# Patient Record
Sex: FEMALE
[De-identification: names, ages, dates, MRNs, and addresses within clinical notes are randomized per-mention and may not be internally consistent; named-entity substitution may affect disease eponyms.]

---

## 2017-11-06 ENCOUNTER — APPOINTMENT (OUTPATIENT)
Dept: OBGYN | Facility: CLINIC | Age: 32
End: 2017-11-06
Payer: COMMERCIAL

## 2017-11-06 ENCOUNTER — LABORATORY RESULT (OUTPATIENT)
Age: 32
End: 2017-11-06

## 2017-11-06 VITALS
DIASTOLIC BLOOD PRESSURE: 78 MMHG | HEIGHT: 62 IN | WEIGHT: 152 LBS | SYSTOLIC BLOOD PRESSURE: 110 MMHG | BODY MASS INDEX: 27.97 KG/M2

## 2017-11-06 DIAGNOSIS — Z78.9 OTHER SPECIFIED HEALTH STATUS: ICD-10-CM

## 2017-11-06 LAB
BASOPHILS # BLD AUTO: 0.01 K/UL
BASOPHILS NFR BLD AUTO: 0.1 %
EOSINOPHIL # BLD AUTO: 0.2 K/UL
EOSINOPHIL NFR BLD AUTO: 2 %
HCT VFR BLD CALC: 35.3 %
HGB BLD-MCNC: 11.9 G/DL
HIV1+2 AB SPEC QL IA.RAPID: NONREACTIVE
IMM GRANULOCYTES NFR BLD AUTO: 0.4 %
LYMPHOCYTES # BLD AUTO: 1.86 K/UL
LYMPHOCYTES NFR BLD AUTO: 19 %
MAN DIFF?: NORMAL
MCHC RBC-ENTMCNC: 29.8 PG
MCHC RBC-ENTMCNC: 33.7 GM/DL
MCV RBC AUTO: 88.5 FL
MONOCYTES # BLD AUTO: 0.43 K/UL
MONOCYTES NFR BLD AUTO: 4.4 %
NEUTROPHILS # BLD AUTO: 7.24 K/UL
NEUTROPHILS NFR BLD AUTO: 74.1 %
PLATELET # BLD AUTO: 271 K/UL
RBC # BLD: 3.99 M/UL
RBC # FLD: 13.6 %
WBC # FLD AUTO: 9.78 K/UL

## 2017-11-06 PROCEDURE — 99213 OFFICE O/P EST LOW 20 MIN: CPT | Mod: 25

## 2017-11-06 PROCEDURE — 99395 PREV VISIT EST AGE 18-39: CPT

## 2017-11-06 PROCEDURE — 99214 OFFICE O/P EST MOD 30 MIN: CPT | Mod: 25

## 2017-11-07 LAB
ABO + RH PNL BLD: NORMAL
BLD GP AB SCN SERPL QL: NORMAL
HBV SURFACE AG SER QL: NONREACTIVE
RUBV IGG FLD-ACNC: 1.5 INDEX
RUBV IGG SER-IMP: POSITIVE
T PALLIDUM AB SER QL IA: NEGATIVE
TSH SERPL-ACNC: 2.03 UIU/ML

## 2017-11-09 LAB
C TRACH RRNA SPEC QL NAA+PROBE: NOT DETECTED
CYTOLOGY CVX/VAG DOC THIN PREP: NORMAL
N GONORRHOEA RRNA SPEC QL NAA+PROBE: NOT DETECTED
SOURCE TP AMPLIFICATION: NORMAL

## 2017-12-05 ENCOUNTER — ASOB RESULT (OUTPATIENT)
Age: 32
End: 2017-12-05

## 2017-12-05 ENCOUNTER — APPOINTMENT (OUTPATIENT)
Dept: ANTEPARTUM | Facility: CLINIC | Age: 32
End: 2017-12-05
Payer: COMMERCIAL

## 2017-12-05 ENCOUNTER — APPOINTMENT (OUTPATIENT)
Dept: OBGYN | Facility: CLINIC | Age: 32
End: 2017-12-05
Payer: COMMERCIAL

## 2017-12-05 VITALS
HEIGHT: 62 IN | SYSTOLIC BLOOD PRESSURE: 110 MMHG | WEIGHT: 185 LBS | BODY MASS INDEX: 34.04 KG/M2 | DIASTOLIC BLOOD PRESSURE: 70 MMHG

## 2017-12-05 LAB
BILIRUB UR QL STRIP: NORMAL
GLUCOSE UR-MCNC: NORMAL
HCG UR QL: 0.2 EU/DL
HGB UR QL STRIP.AUTO: NORMAL
KETONES UR-MCNC: NORMAL
LEUKOCYTE ESTERASE UR QL STRIP: NORMAL
NITRITE UR QL STRIP: NORMAL
PH UR STRIP: 7.5
PROT UR STRIP-MCNC: NORMAL
SP GR UR STRIP: 1.02

## 2017-12-05 PROCEDURE — 76811 OB US DETAILED SNGL FETUS: CPT

## 2017-12-05 PROCEDURE — 0502F SUBSEQUENT PRENATAL CARE: CPT

## 2017-12-05 PROCEDURE — 59426 ANTEPARTUM CARE ONLY: CPT

## 2018-01-02 ENCOUNTER — APPOINTMENT (OUTPATIENT)
Dept: OBGYN | Facility: CLINIC | Age: 33
End: 2018-01-02
Payer: COMMERCIAL

## 2018-01-02 VITALS
HEIGHT: 62 IN | BODY MASS INDEX: 34.78 KG/M2 | SYSTOLIC BLOOD PRESSURE: 108 MMHG | WEIGHT: 189 LBS | DIASTOLIC BLOOD PRESSURE: 60 MMHG

## 2018-01-02 PROCEDURE — 0502F SUBSEQUENT PRENATAL CARE: CPT

## 2018-01-16 ENCOUNTER — LABORATORY RESULT (OUTPATIENT)
Age: 33
End: 2018-01-16

## 2018-01-17 ENCOUNTER — OTHER (OUTPATIENT)
Age: 33
End: 2018-01-17

## 2018-01-30 ENCOUNTER — APPOINTMENT (OUTPATIENT)
Dept: OBGYN | Facility: CLINIC | Age: 33
End: 2018-01-30
Payer: COMMERCIAL

## 2018-01-30 VITALS
BODY MASS INDEX: 35.7 KG/M2 | WEIGHT: 194 LBS | SYSTOLIC BLOOD PRESSURE: 110 MMHG | DIASTOLIC BLOOD PRESSURE: 68 MMHG | HEIGHT: 62 IN

## 2018-01-30 LAB
BILIRUB UR QL STRIP: NORMAL
GLUCOSE UR-MCNC: NORMAL
HCG UR QL: 0.2 EU/DL
HGB UR QL STRIP.AUTO: NORMAL
KETONES UR-MCNC: NORMAL
LEUKOCYTE ESTERASE UR QL STRIP: NORMAL
NITRITE UR QL STRIP: NORMAL
PH UR STRIP: 7.5
PROT UR STRIP-MCNC: NORMAL
SP GR UR STRIP: 1.01

## 2018-01-30 PROCEDURE — 0502F SUBSEQUENT PRENATAL CARE: CPT

## 2018-02-13 ENCOUNTER — APPOINTMENT (OUTPATIENT)
Dept: OBGYN | Facility: CLINIC | Age: 33
End: 2018-02-13
Payer: COMMERCIAL

## 2018-02-13 VITALS
SYSTOLIC BLOOD PRESSURE: 120 MMHG | HEIGHT: 62 IN | WEIGHT: 189 LBS | DIASTOLIC BLOOD PRESSURE: 80 MMHG | BODY MASS INDEX: 34.78 KG/M2

## 2018-02-13 PROCEDURE — 0502F SUBSEQUENT PRENATAL CARE: CPT

## 2018-02-27 ENCOUNTER — ASOB RESULT (OUTPATIENT)
Age: 33
End: 2018-02-27

## 2018-02-27 ENCOUNTER — APPOINTMENT (OUTPATIENT)
Dept: OBGYN | Facility: CLINIC | Age: 33
End: 2018-02-27
Payer: COMMERCIAL

## 2018-02-27 ENCOUNTER — APPOINTMENT (OUTPATIENT)
Dept: ANTEPARTUM | Facility: CLINIC | Age: 33
End: 2018-02-27
Payer: COMMERCIAL

## 2018-02-27 VITALS
WEIGHT: 192 LBS | DIASTOLIC BLOOD PRESSURE: 70 MMHG | SYSTOLIC BLOOD PRESSURE: 106 MMHG | HEIGHT: 62 IN | BODY MASS INDEX: 35.33 KG/M2

## 2018-02-27 LAB
BILIRUB UR QL STRIP: NORMAL
GLUCOSE UR-MCNC: NORMAL
HCG UR QL: 0.2 EU/DL
HGB UR QL STRIP.AUTO: NORMAL
KETONES UR-MCNC: NORMAL
LEUKOCYTE ESTERASE UR QL STRIP: NORMAL
NITRITE UR QL STRIP: NORMAL
PH UR STRIP: 7
PROT UR STRIP-MCNC: NORMAL
SP GR UR STRIP: 1.02

## 2018-02-27 PROCEDURE — 0502F SUBSEQUENT PRENATAL CARE: CPT

## 2018-02-27 PROCEDURE — 90715 TDAP VACCINE 7 YRS/> IM: CPT

## 2018-02-27 PROCEDURE — 76819 FETAL BIOPHYS PROFIL W/O NST: CPT

## 2018-02-27 PROCEDURE — 90471 IMMUNIZATION ADMIN: CPT

## 2018-02-27 PROCEDURE — 76816 OB US FOLLOW-UP PER FETUS: CPT

## 2018-03-13 ENCOUNTER — APPOINTMENT (OUTPATIENT)
Dept: OBGYN | Facility: CLINIC | Age: 33
End: 2018-03-13
Payer: COMMERCIAL

## 2018-03-13 VITALS
DIASTOLIC BLOOD PRESSURE: 62 MMHG | BODY MASS INDEX: 35.51 KG/M2 | SYSTOLIC BLOOD PRESSURE: 104 MMHG | HEIGHT: 62 IN | WEIGHT: 193 LBS

## 2018-03-13 LAB
BILIRUB UR QL STRIP: NORMAL
GLUCOSE UR-MCNC: NORMAL
HCG UR QL: 1 EU/DL
HGB UR QL STRIP.AUTO: NORMAL
KETONES UR-MCNC: NORMAL
LEUKOCYTE ESTERASE UR QL STRIP: NORMAL
NITRITE UR QL STRIP: NORMAL
PH UR STRIP: 7
PROT UR STRIP-MCNC: NORMAL
SP GR UR STRIP: 1.02

## 2018-03-13 PROCEDURE — 0502F SUBSEQUENT PRENATAL CARE: CPT

## 2018-03-14 ENCOUNTER — OTHER (OUTPATIENT)
Age: 33
End: 2018-03-14

## 2018-03-15 LAB
GP B STREP DNA SPEC QL NAA+PROBE: NORMAL
GP B STREP DNA SPEC QL NAA+PROBE: NOT DETECTED
SOURCE GBS: NORMAL

## 2018-03-20 ENCOUNTER — APPOINTMENT (OUTPATIENT)
Dept: OBGYN | Facility: CLINIC | Age: 33
End: 2018-03-20
Payer: COMMERCIAL

## 2018-03-20 VITALS
BODY MASS INDEX: 36.25 KG/M2 | DIASTOLIC BLOOD PRESSURE: 62 MMHG | WEIGHT: 197 LBS | SYSTOLIC BLOOD PRESSURE: 116 MMHG | HEIGHT: 62 IN

## 2018-03-20 LAB
BILIRUB UR QL STRIP: NORMAL
GLUCOSE UR-MCNC: NORMAL
HCG UR QL: 0.2 EU/DL
HGB UR QL STRIP.AUTO: NORMAL
KETONES UR-MCNC: NORMAL
LEUKOCYTE ESTERASE UR QL STRIP: NORMAL
NITRITE UR QL STRIP: NORMAL
PH UR STRIP: 7
PROT UR STRIP-MCNC: NORMAL
SP GR UR STRIP: 1.01

## 2018-03-20 PROCEDURE — 0502F SUBSEQUENT PRENATAL CARE: CPT

## 2018-03-27 ENCOUNTER — OUTPATIENT (OUTPATIENT)
Dept: OUTPATIENT SERVICES | Facility: HOSPITAL | Age: 33
LOS: 1 days | End: 2018-03-27

## 2018-03-27 ENCOUNTER — APPOINTMENT (OUTPATIENT)
Dept: OBGYN | Facility: CLINIC | Age: 33
End: 2018-03-27
Payer: COMMERCIAL

## 2018-03-27 VITALS
BODY MASS INDEX: 36.62 KG/M2 | WEIGHT: 199 LBS | HEIGHT: 62 IN | DIASTOLIC BLOOD PRESSURE: 80 MMHG | SYSTOLIC BLOOD PRESSURE: 110 MMHG

## 2018-03-27 DIAGNOSIS — Z98.890 OTHER SPECIFIED POSTPROCEDURAL STATES: ICD-10-CM

## 2018-03-27 DIAGNOSIS — Z98.89 OTHER SPECIFIED POSTPROCEDURAL STATES: Chronic | ICD-10-CM

## 2018-03-27 LAB
APPEARANCE UR: SIGNIFICANT CHANGE UP
BACTERIA # UR AUTO: SIGNIFICANT CHANGE UP
BILIRUB UR QL STRIP: NORMAL
BILIRUB UR-MCNC: NEGATIVE — SIGNIFICANT CHANGE UP
BLD GP AB SCN SERPL QL: NEGATIVE — SIGNIFICANT CHANGE UP
BLOOD UR QL VISUAL: NEGATIVE — SIGNIFICANT CHANGE UP
COLOR SPEC: SIGNIFICANT CHANGE UP
GLUCOSE UR-MCNC: NEGATIVE — SIGNIFICANT CHANGE UP
GLUCOSE UR-MCNC: NORMAL
HCG UR QL: 1 EU/DL
HCT VFR BLD CALC: 30 % — LOW (ref 34.5–45)
HGB BLD-MCNC: 9.6 G/DL — LOW (ref 11.5–15.5)
HGB UR QL STRIP.AUTO: NORMAL
KETONES UR-MCNC: NEGATIVE — SIGNIFICANT CHANGE UP
KETONES UR-MCNC: NORMAL
LEUKOCYTE ESTERASE UR QL STRIP: NORMAL
LEUKOCYTE ESTERASE UR-ACNC: NEGATIVE — SIGNIFICANT CHANGE UP
MCHC RBC-ENTMCNC: 27.1 PG — SIGNIFICANT CHANGE UP (ref 27–34)
MCHC RBC-ENTMCNC: 32 % — SIGNIFICANT CHANGE UP (ref 32–36)
MCV RBC AUTO: 84.7 FL — SIGNIFICANT CHANGE UP (ref 80–100)
NITRITE UR QL STRIP: NORMAL
NITRITE UR-MCNC: NEGATIVE — SIGNIFICANT CHANGE UP
NON-SQ EPI CELLS # UR AUTO: <1 — SIGNIFICANT CHANGE UP
NRBC # FLD: 0 — SIGNIFICANT CHANGE UP
PH UR STRIP: 7
PH UR: 7 — SIGNIFICANT CHANGE UP (ref 4.6–8)
PLATELET # BLD AUTO: 198 K/UL — SIGNIFICANT CHANGE UP (ref 150–400)
PMV BLD: 11.5 FL — SIGNIFICANT CHANGE UP (ref 7–13)
PROT UR STRIP-MCNC: NORMAL
PROT UR-MCNC: NEGATIVE MG/DL — SIGNIFICANT CHANGE UP
RBC # BLD: 3.54 M/UL — LOW (ref 3.8–5.2)
RBC # FLD: 13.1 % — SIGNIFICANT CHANGE UP (ref 10.3–14.5)
RBC CASTS # UR COMP ASSIST: SIGNIFICANT CHANGE UP (ref 0–?)
RH IG SCN BLD-IMP: POSITIVE — SIGNIFICANT CHANGE UP
SP GR SPEC: 1.01 — SIGNIFICANT CHANGE UP (ref 1–1.04)
SP GR UR STRIP: 1.02
SQUAMOUS # UR AUTO: SIGNIFICANT CHANGE UP
UROBILINOGEN FLD QL: NORMAL MG/DL — SIGNIFICANT CHANGE UP
WBC # BLD: 7.54 K/UL — SIGNIFICANT CHANGE UP (ref 3.8–10.5)
WBC # FLD AUTO: 7.54 K/UL — SIGNIFICANT CHANGE UP (ref 3.8–10.5)
WBC UR QL: SIGNIFICANT CHANGE UP (ref 0–?)

## 2018-03-27 PROCEDURE — 0502F SUBSEQUENT PRENATAL CARE: CPT

## 2018-03-27 RX ORDER — METOCLOPRAMIDE HCL 10 MG
10 TABLET ORAL ONCE
Qty: 0 | Refills: 0 | Status: COMPLETED | OUTPATIENT
Start: 2018-04-02 | End: 2018-04-02

## 2018-03-27 RX ORDER — CITRIC ACID/SODIUM CITRATE 300-500 MG
30 SOLUTION, ORAL ORAL ONCE
Qty: 0 | Refills: 0 | Status: COMPLETED | OUTPATIENT
Start: 2018-04-02 | End: 2018-04-02

## 2018-03-28 LAB — T PALLIDUM AB TITR SER: NEGATIVE — SIGNIFICANT CHANGE UP

## 2018-04-01 ENCOUNTER — TRANSCRIPTION ENCOUNTER (OUTPATIENT)
Age: 33
End: 2018-04-01

## 2018-04-02 ENCOUNTER — TRANSCRIPTION ENCOUNTER (OUTPATIENT)
Age: 33
End: 2018-04-02

## 2018-04-02 ENCOUNTER — APPOINTMENT (OUTPATIENT)
Dept: OBGYN | Facility: CLINIC | Age: 33
End: 2018-04-02

## 2018-04-02 ENCOUNTER — INPATIENT (INPATIENT)
Facility: HOSPITAL | Age: 33
LOS: 2 days | Discharge: HOME | End: 2018-04-05
Attending: OBSTETRICS & GYNECOLOGY | Admitting: OBSTETRICS & GYNECOLOGY
Payer: MEDICAID

## 2018-04-02 VITALS — HEIGHT: 61 IN | WEIGHT: 196.21 LBS

## 2018-04-02 DIAGNOSIS — Z98.890 OTHER SPECIFIED POSTPROCEDURAL STATES: ICD-10-CM

## 2018-04-02 DIAGNOSIS — Z98.89 OTHER SPECIFIED POSTPROCEDURAL STATES: Chronic | ICD-10-CM

## 2018-04-02 LAB
BASOPHILS # BLD AUTO: 0.03 K/UL — SIGNIFICANT CHANGE UP (ref 0–0.2)
BASOPHILS NFR BLD AUTO: 0.3 % — SIGNIFICANT CHANGE UP (ref 0–2)
BLD GP AB SCN SERPL QL: NEGATIVE — SIGNIFICANT CHANGE UP
EOSINOPHIL # BLD AUTO: 0.05 K/UL — SIGNIFICANT CHANGE UP (ref 0–0.5)
EOSINOPHIL NFR BLD AUTO: 0.6 % — SIGNIFICANT CHANGE UP (ref 0–6)
HCT VFR BLD CALC: 30.8 % — LOW (ref 34.5–45)
HGB BLD-MCNC: 9.8 G/DL — LOW (ref 11.5–15.5)
IMM GRANULOCYTES # BLD AUTO: 0.11 # — SIGNIFICANT CHANGE UP
IMM GRANULOCYTES NFR BLD AUTO: 1.3 % — SIGNIFICANT CHANGE UP (ref 0–1.5)
LYMPHOCYTES # BLD AUTO: 1.64 K/UL — SIGNIFICANT CHANGE UP (ref 1–3.3)
LYMPHOCYTES # BLD AUTO: 19.1 % — SIGNIFICANT CHANGE UP (ref 13–44)
MCHC RBC-ENTMCNC: 27.3 PG — SIGNIFICANT CHANGE UP (ref 27–34)
MCHC RBC-ENTMCNC: 31.8 % — LOW (ref 32–36)
MCV RBC AUTO: 85.8 FL — SIGNIFICANT CHANGE UP (ref 80–100)
MONOCYTES # BLD AUTO: 0.72 K/UL — SIGNIFICANT CHANGE UP (ref 0–0.9)
MONOCYTES NFR BLD AUTO: 8.4 % — SIGNIFICANT CHANGE UP (ref 2–14)
NEUTROPHILS # BLD AUTO: 6.05 K/UL — SIGNIFICANT CHANGE UP (ref 1.8–7.4)
NEUTROPHILS NFR BLD AUTO: 70.3 % — SIGNIFICANT CHANGE UP (ref 43–77)
NRBC # FLD: 0 — SIGNIFICANT CHANGE UP
PLATELET # BLD AUTO: 217 K/UL — SIGNIFICANT CHANGE UP (ref 150–400)
PMV BLD: 11.3 FL — SIGNIFICANT CHANGE UP (ref 7–13)
RBC # BLD: 3.59 M/UL — LOW (ref 3.8–5.2)
RBC # FLD: 13.4 % — SIGNIFICANT CHANGE UP (ref 10.3–14.5)
RH IG SCN BLD-IMP: POSITIVE — SIGNIFICANT CHANGE UP
WBC # BLD: 8.6 K/UL — SIGNIFICANT CHANGE UP (ref 3.8–10.5)
WBC # FLD AUTO: 8.6 K/UL — SIGNIFICANT CHANGE UP (ref 3.8–10.5)

## 2018-04-02 PROCEDURE — 59514 CESAREAN DELIVERY ONLY: CPT | Mod: AS,U9

## 2018-04-02 PROCEDURE — 59515 CESAREAN DELIVERY: CPT | Mod: U9

## 2018-04-02 PROCEDURE — 59510 CESAREAN DELIVERY: CPT | Mod: U9

## 2018-04-02 RX ORDER — KETOROLAC TROMETHAMINE 30 MG/ML
30 SYRINGE (ML) INJECTION EVERY 6 HOURS
Qty: 0 | Refills: 0 | Status: DISCONTINUED | OUTPATIENT
Start: 2018-04-02 | End: 2018-04-03

## 2018-04-02 RX ORDER — HYDROMORPHONE HYDROCHLORIDE 2 MG/ML
1 INJECTION INTRAMUSCULAR; INTRAVENOUS; SUBCUTANEOUS ONCE
Qty: 0 | Refills: 0 | Status: DISCONTINUED | OUTPATIENT
Start: 2018-04-02 | End: 2018-04-02

## 2018-04-02 RX ORDER — HYDROMORPHONE HYDROCHLORIDE 2 MG/ML
0.5 INJECTION INTRAMUSCULAR; INTRAVENOUS; SUBCUTANEOUS
Qty: 0 | Refills: 0 | Status: DISCONTINUED | OUTPATIENT
Start: 2018-04-02 | End: 2018-04-03

## 2018-04-02 RX ORDER — FENTANYL CITRATE 50 UG/ML
25 INJECTION INTRAVENOUS
Qty: 0 | Refills: 0 | Status: DISCONTINUED | OUTPATIENT
Start: 2018-04-02 | End: 2018-04-03

## 2018-04-02 RX ORDER — SODIUM CHLORIDE 9 MG/ML
1000 INJECTION, SOLUTION INTRAVENOUS
Qty: 0 | Refills: 0 | Status: DISCONTINUED | OUTPATIENT
Start: 2018-04-02 | End: 2018-04-03

## 2018-04-02 RX ORDER — SODIUM CHLORIDE 9 MG/ML
1000 INJECTION, SOLUTION INTRAVENOUS
Qty: 0 | Refills: 0 | Status: DISCONTINUED | OUTPATIENT
Start: 2018-04-02 | End: 2018-04-02

## 2018-04-02 RX ORDER — ACETAMINOPHEN 500 MG
975 TABLET ORAL EVERY 6 HOURS
Qty: 0 | Refills: 0 | Status: DISCONTINUED | OUTPATIENT
Start: 2018-04-02 | End: 2018-04-05

## 2018-04-02 RX ORDER — FERROUS SULFATE 325(65) MG
325 TABLET ORAL DAILY
Qty: 0 | Refills: 0 | Status: DISCONTINUED | OUTPATIENT
Start: 2018-04-02 | End: 2018-04-05

## 2018-04-02 RX ORDER — OXYTOCIN 10 UNIT/ML
41.67 VIAL (ML) INJECTION
Qty: 20 | Refills: 0 | Status: DISCONTINUED | OUTPATIENT
Start: 2018-04-02 | End: 2018-04-03

## 2018-04-02 RX ORDER — FAMOTIDINE 10 MG/ML
20 INJECTION INTRAVENOUS ONCE
Qty: 0 | Refills: 0 | Status: COMPLETED | OUTPATIENT
Start: 2018-04-02 | End: 2018-04-02

## 2018-04-02 RX ORDER — HYDROMORPHONE HYDROCHLORIDE 2 MG/ML
1 INJECTION INTRAMUSCULAR; INTRAVENOUS; SUBCUTANEOUS ONCE
Qty: 0 | Refills: 0 | Status: DISCONTINUED | OUTPATIENT
Start: 2018-04-02 | End: 2018-04-03

## 2018-04-02 RX ORDER — IBUPROFEN 200 MG
600 TABLET ORAL EVERY 6 HOURS
Qty: 0 | Refills: 0 | Status: DISCONTINUED | OUTPATIENT
Start: 2018-04-02 | End: 2018-04-02

## 2018-04-02 RX ORDER — NALOXONE HYDROCHLORIDE 4 MG/.1ML
0.1 SPRAY NASAL
Qty: 0 | Refills: 0 | Status: DISCONTINUED | OUTPATIENT
Start: 2018-04-02 | End: 2018-04-03

## 2018-04-02 RX ORDER — SODIUM CHLORIDE 9 MG/ML
1000 INJECTION, SOLUTION INTRAVENOUS ONCE
Qty: 0 | Refills: 0 | Status: DISCONTINUED | OUTPATIENT
Start: 2018-04-02 | End: 2018-04-03

## 2018-04-02 RX ORDER — OXYTOCIN 10 UNIT/ML
333.33 VIAL (ML) INJECTION
Qty: 20 | Refills: 0 | Status: DISCONTINUED | OUTPATIENT
Start: 2018-04-02 | End: 2018-04-03

## 2018-04-02 RX ORDER — SIMETHICONE 80 MG/1
80 TABLET, CHEWABLE ORAL EVERY 4 HOURS
Qty: 0 | Refills: 0 | Status: DISCONTINUED | OUTPATIENT
Start: 2018-04-02 | End: 2018-04-05

## 2018-04-02 RX ORDER — ONDANSETRON 8 MG/1
4 TABLET, FILM COATED ORAL EVERY 6 HOURS
Qty: 0 | Refills: 0 | Status: DISCONTINUED | OUTPATIENT
Start: 2018-04-02 | End: 2018-04-04

## 2018-04-02 RX ORDER — ONDANSETRON 8 MG/1
4 TABLET, FILM COATED ORAL ONCE
Qty: 0 | Refills: 0 | Status: DISCONTINUED | OUTPATIENT
Start: 2018-04-02 | End: 2018-04-02

## 2018-04-02 RX ORDER — BUTORPHANOL TARTRATE 2 MG/ML
0.12 INJECTION, SOLUTION INTRAMUSCULAR; INTRAVENOUS EVERY 6 HOURS
Qty: 0 | Refills: 0 | Status: DISCONTINUED | OUTPATIENT
Start: 2018-04-02 | End: 2018-04-03

## 2018-04-02 RX ORDER — HEPARIN SODIUM 5000 [USP'U]/ML
5000 INJECTION INTRAVENOUS; SUBCUTANEOUS EVERY 12 HOURS
Qty: 0 | Refills: 0 | Status: DISCONTINUED | OUTPATIENT
Start: 2018-04-02 | End: 2018-04-05

## 2018-04-02 RX ORDER — DOCUSATE SODIUM 100 MG
100 CAPSULE ORAL
Qty: 0 | Refills: 0 | Status: DISCONTINUED | OUTPATIENT
Start: 2018-04-02 | End: 2018-04-05

## 2018-04-02 RX ORDER — OXYCODONE HYDROCHLORIDE 5 MG/1
10 TABLET ORAL
Qty: 0 | Refills: 0 | Status: DISCONTINUED | OUTPATIENT
Start: 2018-04-02 | End: 2018-04-03

## 2018-04-02 RX ORDER — IBUPROFEN 200 MG
600 TABLET ORAL EVERY 6 HOURS
Qty: 0 | Refills: 0 | Status: DISCONTINUED | OUTPATIENT
Start: 2018-04-02 | End: 2018-04-03

## 2018-04-02 RX ORDER — OXYCODONE HYDROCHLORIDE 5 MG/1
5 TABLET ORAL
Qty: 0 | Refills: 0 | Status: DISCONTINUED | OUTPATIENT
Start: 2018-04-02 | End: 2018-04-03

## 2018-04-02 RX ORDER — ACETAMINOPHEN 500 MG
975 TABLET ORAL EVERY 6 HOURS
Qty: 0 | Refills: 0 | Status: DISCONTINUED | OUTPATIENT
Start: 2018-04-02 | End: 2018-04-02

## 2018-04-02 RX ORDER — INFLUENZA VIRUS VACCINE 15; 15; 15; 15 UG/.5ML; UG/.5ML; UG/.5ML; UG/.5ML
0.5 SUSPENSION INTRAMUSCULAR ONCE
Qty: 0 | Refills: 0 | Status: DISCONTINUED | OUTPATIENT
Start: 2018-04-02 | End: 2018-04-05

## 2018-04-02 RX ORDER — TETANUS TOXOID, REDUCED DIPHTHERIA TOXOID AND ACELLULAR PERTUSSIS VACCINE, ADSORBED 5; 2.5; 8; 8; 2.5 [IU]/.5ML; [IU]/.5ML; UG/.5ML; UG/.5ML; UG/.5ML
0.5 SUSPENSION INTRAMUSCULAR ONCE
Qty: 0 | Refills: 0 | Status: DISCONTINUED | OUTPATIENT
Start: 2018-04-02 | End: 2018-04-05

## 2018-04-02 RX ORDER — DIPHENHYDRAMINE HCL 50 MG
25 CAPSULE ORAL EVERY 6 HOURS
Qty: 0 | Refills: 0 | Status: DISCONTINUED | OUTPATIENT
Start: 2018-04-02 | End: 2018-04-05

## 2018-04-02 RX ORDER — LANOLIN
1 OINTMENT (GRAM) TOPICAL
Qty: 0 | Refills: 0 | Status: DISCONTINUED | OUTPATIENT
Start: 2018-04-02 | End: 2018-04-05

## 2018-04-02 RX ORDER — GLYCERIN ADULT
1 SUPPOSITORY, RECTAL RECTAL AT BEDTIME
Qty: 0 | Refills: 0 | Status: DISCONTINUED | OUTPATIENT
Start: 2018-04-02 | End: 2018-04-05

## 2018-04-02 RX ADMIN — HEPARIN SODIUM 5000 UNIT(S): 5000 INJECTION INTRAVENOUS; SUBCUTANEOUS at 23:05

## 2018-04-02 RX ADMIN — Medication 10 MILLIGRAM(S): at 11:41

## 2018-04-02 RX ADMIN — Medication 30 MILLIGRAM(S): at 20:07

## 2018-04-02 RX ADMIN — Medication 30 MILLIGRAM(S): at 21:00

## 2018-04-02 RX ADMIN — Medication 125 MILLIUNIT(S)/MIN: at 15:36

## 2018-04-02 RX ADMIN — FAMOTIDINE 20 MILLIGRAM(S): 10 INJECTION INTRAVENOUS at 11:41

## 2018-04-02 RX ADMIN — SODIUM CHLORIDE 75 MILLILITER(S): 9 INJECTION, SOLUTION INTRAVENOUS at 15:20

## 2018-04-02 RX ADMIN — Medication 30 MILLILITER(S): at 11:42

## 2018-04-02 NOTE — DISCHARGE NOTE OB - CARE PROVIDER_API CALL
Emerald Higginbotham), Obstetrics and Gynecology  [address]  [address]  [place] [address]  Phone: [phone]  Fax: [phone]

## 2018-04-02 NOTE — DISCHARGE NOTE OB - MATERIALS PROVIDED
Discharge Medication Information for Patients and Families Pocket Guide/[facility]  Screening Program/Birth Certificate Instructions/  Immunization Record/[facility] Hearing Screen Program/Shaken Baby Prevention Handout/Breastfeeding Guide and Packet/Vaccinations/Breastfeeding Log/Breastfeeding Mother’s Support Group Information/Guide to Postpartum Care/Back To Sleep Handout

## 2018-04-02 NOTE — DISCHARGE NOTE OB - CARE PLAN
Principal Discharge DX:	 delivery delivered  Goal:	recovery  Assessment and plan of treatment:	no heavy lifitng x 6 weeks  follow up 2 weeks  regular diet

## 2018-04-02 NOTE — DISCHARGE NOTE OB - PATIENT PORTAL LINK FT
You can access the [facility] Patient Portal, offered by [facility], by registering with the following website: [email or web address]

## 2018-04-03 LAB
BASOPHILS # BLD AUTO: 0.02 K/UL — SIGNIFICANT CHANGE UP (ref 0–0.2)
BASOPHILS NFR BLD AUTO: 0.2 % — SIGNIFICANT CHANGE UP (ref 0–2)
EOSINOPHIL # BLD AUTO: 0.05 K/UL — SIGNIFICANT CHANGE UP (ref 0–0.5)
EOSINOPHIL NFR BLD AUTO: 0.4 % — SIGNIFICANT CHANGE UP (ref 0–6)
HCT VFR BLD CALC: 27.5 % — LOW (ref 34.5–45)
HGB BLD-MCNC: 8.7 G/DL — LOW (ref 11.5–15.5)
IMM GRANULOCYTES # BLD AUTO: 0.09 # — SIGNIFICANT CHANGE UP
IMM GRANULOCYTES NFR BLD AUTO: 0.8 % — SIGNIFICANT CHANGE UP (ref 0–1.5)
LYMPHOCYTES # BLD AUTO: 1.65 K/UL — SIGNIFICANT CHANGE UP (ref 1–3.3)
LYMPHOCYTES # BLD AUTO: 13.8 % — SIGNIFICANT CHANGE UP (ref 13–44)
MCHC RBC-ENTMCNC: 27.1 PG — SIGNIFICANT CHANGE UP (ref 27–34)
MCHC RBC-ENTMCNC: 31.6 % — LOW (ref 32–36)
MCV RBC AUTO: 85.7 FL — SIGNIFICANT CHANGE UP (ref 80–100)
MONOCYTES # BLD AUTO: 1.06 K/UL — HIGH (ref 0–0.9)
MONOCYTES NFR BLD AUTO: 8.9 % — SIGNIFICANT CHANGE UP (ref 2–14)
NEUTROPHILS # BLD AUTO: 9.09 K/UL — HIGH (ref 1.8–7.4)
NEUTROPHILS NFR BLD AUTO: 75.9 % — SIGNIFICANT CHANGE UP (ref 43–77)
NRBC # FLD: 0 — SIGNIFICANT CHANGE UP
PLATELET # BLD AUTO: 194 K/UL — SIGNIFICANT CHANGE UP (ref 150–400)
PMV BLD: 11.3 FL — SIGNIFICANT CHANGE UP (ref 7–13)
RBC # BLD: 3.21 M/UL — LOW (ref 3.8–5.2)
RBC # FLD: 13.3 % — SIGNIFICANT CHANGE UP (ref 10.3–14.5)
WBC # BLD: 11.96 K/UL — HIGH (ref 3.8–10.5)
WBC # FLD AUTO: 11.96 K/UL — HIGH (ref 3.8–10.5)

## 2018-04-03 RX ORDER — OXYCODONE HYDROCHLORIDE 5 MG/1
5 TABLET ORAL
Qty: 0 | Refills: 0 | Status: DISCONTINUED | OUTPATIENT
Start: 2018-04-03 | End: 2018-04-05

## 2018-04-03 RX ORDER — OXYCODONE HYDROCHLORIDE 5 MG/1
5 TABLET ORAL EVERY 4 HOURS
Qty: 0 | Refills: 0 | Status: DISCONTINUED | OUTPATIENT
Start: 2018-04-03 | End: 2018-04-05

## 2018-04-03 RX ORDER — OXYCODONE HYDROCHLORIDE 5 MG/1
5 TABLET ORAL
Qty: 0 | Refills: 0 | Status: COMPLETED | OUTPATIENT
Start: 2018-04-03 | End: 2018-04-10

## 2018-04-03 RX ORDER — IBUPROFEN 200 MG
600 TABLET ORAL EVERY 6 HOURS
Qty: 0 | Refills: 0 | Status: COMPLETED | OUTPATIENT
Start: 2018-04-03 | End: 2019-03-02

## 2018-04-03 RX ORDER — IBUPROFEN 200 MG
600 TABLET ORAL EVERY 6 HOURS
Qty: 0 | Refills: 0 | Status: DISCONTINUED | OUTPATIENT
Start: 2018-04-03 | End: 2018-04-05

## 2018-04-03 RX ADMIN — Medication 100 MILLIGRAM(S): at 14:53

## 2018-04-03 RX ADMIN — Medication 30 MILLIGRAM(S): at 04:15

## 2018-04-03 RX ADMIN — Medication 975 MILLIGRAM(S): at 14:54

## 2018-04-03 RX ADMIN — Medication 30 MILLIGRAM(S): at 03:09

## 2018-04-03 RX ADMIN — Medication 600 MILLIGRAM(S): at 20:56

## 2018-04-03 RX ADMIN — Medication 30 MILLIGRAM(S): at 09:02

## 2018-04-03 RX ADMIN — OXYCODONE HYDROCHLORIDE 5 MILLIGRAM(S): 5 TABLET ORAL at 22:49

## 2018-04-03 RX ADMIN — OXYCODONE HYDROCHLORIDE 5 MILLIGRAM(S): 5 TABLET ORAL at 15:40

## 2018-04-03 RX ADMIN — Medication 30 MILLIGRAM(S): at 09:25

## 2018-04-03 RX ADMIN — Medication 975 MILLIGRAM(S): at 20:56

## 2018-04-03 RX ADMIN — OXYCODONE HYDROCHLORIDE 5 MILLIGRAM(S): 5 TABLET ORAL at 14:54

## 2018-04-03 RX ADMIN — Medication 600 MILLIGRAM(S): at 14:53

## 2018-04-03 RX ADMIN — HEPARIN SODIUM 5000 UNIT(S): 5000 INJECTION INTRAVENOUS; SUBCUTANEOUS at 12:11

## 2018-04-03 RX ADMIN — OXYCODONE HYDROCHLORIDE 5 MILLIGRAM(S): 5 TABLET ORAL at 18:47

## 2018-04-03 RX ADMIN — Medication 600 MILLIGRAM(S): at 15:40

## 2018-04-03 NOTE — LACTATION INITIAL EVALUATION - LACTATION INTERVENTIONS
initiate skin to skin/initiate hand expression routine/assisted with deep latch and positioning . instructed  to offer both  breast at a feeding ,feed on cue and safe  skin to skin.  discussed  signs  of  effective  feeding and  swallowing.  discussed  compression at  breast when  nbn  stops  drinking  and  is  still sucking.  .

## 2018-04-03 NOTE — PROGRESS NOTE ADULT - ATTENDING COMMENTS
Pt seen and examined and agree with above Pt seen and examined and agree with above  Tachycardic (107)  repeat cbc in am

## 2018-04-03 NOTE — LACTATION INITIAL EVALUATION - INTERVENTION OUTCOME
nbn demonstrated  deep latch and  performed  with sucking and swallowing  noted  .  offered  assistance  a s needed ./verbalizes understanding/demonstrates understanding of teaching

## 2018-04-03 NOTE — PROGRESS NOTE ADULT - SUBJECTIVE AND OBJECTIVE BOX
Patient seen and examined at bedside, no acute overnight events. No acute complaints, pain well controlled. Patient is ambulating and tolerating regular diet. Has not yet passed flatus or had BM. Herndon is still in place.     Vital Signs Last 24 Hours  T(C): 36.7 (04-03-18 @ 05:26), Max: 36.7 (04-02-18 @ 18:26)  HR: 96 (04-03-18 @ 05:26) (68 - 101)  BP: 106/65 (04-03-18 @ 05:26) (92/77 - 129/71)  RR: 18 (04-03-18 @ 05:26) (13 - 22)  SpO2: 100% (04-03-18 @ 05:26) (98% - 100%)    I&O's Summary    02 Apr 2018 07:01  -  03 Apr 2018 07:00  --------------------------------------------------------  IN: 4725 mL / OUT: 3695 mL / NET: 1030 mL        Physical Exam:  General: NAD  Abdomen: soft, appropriately tender, non-distended, fundus firm  Incision: Pfannenstiel incision CDI, no drainage, no erythema, subcuticular suture closure, steristrips in place  Pelvic: lochia wnl    Labs:  Blood Type: A Positive  Antibody Screen: Negative  RPR: Negative               8.7    11.96 )-----------( 194      ( 04-03 @ 07:05 )             27.5                9.8    8.60  )-----------( 217      ( 04-02 @ 10:39 )             30.8                9.6    7.54  )-----------( 198      ( 03-27 @ 14:15 )             30.0         MEDICATIONS  (STANDING):  acetaminophen   Tablet 975 milliGRAM(s) Oral every 6 hours  diphtheria/tetanus/pertussis (acellular) Vaccine (ADAcel) 0.5 milliLiter(s) IntraMuscular once  ferrous    sulfate 325 milliGRAM(s) Oral daily  heparin  Injectable 5000 Unit(s) SubCutaneous every 12 hours  ibuprofen  Tablet 600 milliGRAM(s) Oral every 6 hours  influenza   Vaccine 0.5 milliLiter(s) IntraMuscular once  ketorolac   Injectable 30 milliGRAM(s) IV Push every 6 hours  lactated ringers. 1000 milliLiter(s) (125 mL/Hr) IV Continuous <Continuous>  oxyCODONE    IR 5 milliGRAM(s) Oral every 3 hours  prenatal multivitamin 1 Tablet(s) Oral daily    MEDICATIONS  (PRN):  butorphanol Injectable 0.125 milliGRAM(s) IV Push every 6 hours PRN Pruritus  diphenhydrAMINE   Capsule 25 milliGRAM(s) Oral every 6 hours PRN Itching  docusate sodium 100 milliGRAM(s) Oral two times a day PRN Stool Softening  glycerin Suppository - Adult 1 Suppository(s) Rectal at bedtime PRN Constipation  lanolin Ointment 1 Application(s) Topical every 3 hours PRN Sore Nipples  naloxone Injectable 0.1 milliGRAM(s) IV Push every 3 minutes PRN For ANY of the following changes in patient status:  A. RR LESS THAN 10 breaths per minute, B. Oxygen saturation LESS THAN 90%, C. Sedation score of 6  ondansetron Injectable 4 milliGRAM(s) IV Push every 6 hours PRN Nausea  oxyCODONE    IR 5 milliGRAM(s) Oral every 3 hours PRN Mild Pain  oxyCODONE    IR 10 milliGRAM(s) Oral every 3 hours PRN Moderate Pain  oxyCODONE    IR 5 milliGRAM(s) Oral every 4 hours PRN Severe Pain (7 - 10)  simethicone 80 milliGRAM(s) Chew every 4 hours PRN Gas

## 2018-04-03 NOTE — PROGRESS NOTE ADULT - PROBLEM SELECTOR PLAN 1
- check CBC  - continue with PO analgesia  - increase ambulation  - continue regular diet  - encourage incentive spirometry  - d/c IV fluids  - d/c starks  - incision dressing removed    Wendi Ca MD PGY1

## 2018-04-03 NOTE — PROGRESS NOTE ADULT - SUBJECTIVE AND OBJECTIVE BOX
ANESTHESIA POSTOP CHECK    32y Female POSTOP DAY 1 S/P Csection    Vital Signs Last 24 Hrs  T(C): 36.7 (03 Apr 2018 05:26), Max: 36.7 (02 Apr 2018 18:26)  T(F): 98.1 (03 Apr 2018 05:26), Max: 98.1 (02 Apr 2018 18:26)  HR: 96 (03 Apr 2018 05:26) (68 - 101)  BP: 106/65 (03 Apr 2018 05:26) (92/77 - 129/71)  BP(mean): 88 (02 Apr 2018 17:00) (77 - 94)  RR: 18 (03 Apr 2018 05:26) (13 - 22)  SpO2: 100% (03 Apr 2018 05:26) (98% - 100%)  I&O's Summary    02 Apr 2018 07:01  -  03 Apr 2018 07:00  --------------------------------------------------------  IN: 4600 mL / OUT: 3695 mL / NET: 905 mL        [x ] NO APPARENT ANESTHESIA COMPLICATIONS      Comments:

## 2018-04-04 LAB
HCT VFR BLD CALC: 27 % — LOW (ref 34.5–45)
HGB BLD-MCNC: 8.5 G/DL — LOW (ref 11.5–15.5)
MCHC RBC-ENTMCNC: 27.5 PG — SIGNIFICANT CHANGE UP (ref 27–34)
MCHC RBC-ENTMCNC: 31.5 % — LOW (ref 32–36)
MCV RBC AUTO: 87.4 FL — SIGNIFICANT CHANGE UP (ref 80–100)
NRBC # FLD: 0 — SIGNIFICANT CHANGE UP
PLATELET # BLD AUTO: 214 K/UL — SIGNIFICANT CHANGE UP (ref 150–400)
PMV BLD: 11.3 FL — SIGNIFICANT CHANGE UP (ref 7–13)
RBC # BLD: 3.09 M/UL — LOW (ref 3.8–5.2)
RBC # FLD: 13.8 % — SIGNIFICANT CHANGE UP (ref 10.3–14.5)
WBC # BLD: 13.56 K/UL — HIGH (ref 3.8–10.5)
WBC # FLD AUTO: 13.56 K/UL — HIGH (ref 3.8–10.5)

## 2018-04-04 RX ORDER — HYDROMORPHONE HYDROCHLORIDE 2 MG/ML
2 INJECTION INTRAMUSCULAR; INTRAVENOUS; SUBCUTANEOUS ONCE
Qty: 0 | Refills: 0 | Status: DISCONTINUED | OUTPATIENT
Start: 2018-04-04 | End: 2018-04-04

## 2018-04-04 RX ORDER — MAGNESIUM HYDROXIDE 400 MG/1
30 TABLET, CHEWABLE ORAL DAILY
Qty: 0 | Refills: 0 | Status: DISCONTINUED | OUTPATIENT
Start: 2018-04-04 | End: 2018-04-05

## 2018-04-04 RX ADMIN — SIMETHICONE 80 MILLIGRAM(S): 80 TABLET, CHEWABLE ORAL at 09:30

## 2018-04-04 RX ADMIN — HYDROMORPHONE HYDROCHLORIDE 2 MILLIGRAM(S): 2 INJECTION INTRAMUSCULAR; INTRAVENOUS; SUBCUTANEOUS at 00:45

## 2018-04-04 RX ADMIN — Medication 600 MILLIGRAM(S): at 11:45

## 2018-04-04 RX ADMIN — Medication 975 MILLIGRAM(S): at 19:51

## 2018-04-04 RX ADMIN — Medication 600 MILLIGRAM(S): at 23:11

## 2018-04-04 RX ADMIN — MAGNESIUM HYDROXIDE 30 MILLILITER(S): 400 TABLET, CHEWABLE ORAL at 17:48

## 2018-04-04 RX ADMIN — OXYCODONE HYDROCHLORIDE 5 MILLIGRAM(S): 5 TABLET ORAL at 04:40

## 2018-04-04 RX ADMIN — Medication 600 MILLIGRAM(S): at 17:46

## 2018-04-04 RX ADMIN — Medication 100 MILLIGRAM(S): at 12:19

## 2018-04-04 RX ADMIN — Medication 975 MILLIGRAM(S): at 13:54

## 2018-04-04 RX ADMIN — OXYCODONE HYDROCHLORIDE 5 MILLIGRAM(S): 5 TABLET ORAL at 09:00

## 2018-04-04 RX ADMIN — Medication 600 MILLIGRAM(S): at 23:59

## 2018-04-04 RX ADMIN — Medication 600 MILLIGRAM(S): at 03:01

## 2018-04-04 RX ADMIN — OXYCODONE HYDROCHLORIDE 5 MILLIGRAM(S): 5 TABLET ORAL at 13:54

## 2018-04-04 RX ADMIN — OXYCODONE HYDROCHLORIDE 5 MILLIGRAM(S): 5 TABLET ORAL at 14:44

## 2018-04-04 RX ADMIN — Medication 325 MILLIGRAM(S): at 12:19

## 2018-04-04 RX ADMIN — HEPARIN SODIUM 5000 UNIT(S): 5000 INJECTION INTRAVENOUS; SUBCUTANEOUS at 12:18

## 2018-04-04 RX ADMIN — OXYCODONE HYDROCHLORIDE 5 MILLIGRAM(S): 5 TABLET ORAL at 08:00

## 2018-04-04 RX ADMIN — OXYCODONE HYDROCHLORIDE 5 MILLIGRAM(S): 5 TABLET ORAL at 19:51

## 2018-04-04 RX ADMIN — Medication 600 MILLIGRAM(S): at 10:51

## 2018-04-04 RX ADMIN — Medication 975 MILLIGRAM(S): at 03:02

## 2018-04-04 RX ADMIN — Medication 1 TABLET(S): at 12:19

## 2018-04-04 RX ADMIN — HEPARIN SODIUM 5000 UNIT(S): 5000 INJECTION INTRAVENOUS; SUBCUTANEOUS at 00:19

## 2018-04-04 RX ADMIN — OXYCODONE HYDROCHLORIDE 5 MILLIGRAM(S): 5 TABLET ORAL at 20:30

## 2018-04-04 RX ADMIN — Medication 600 MILLIGRAM(S): at 17:00

## 2018-04-04 NOTE — PROGRESS NOTE ADULT - SUBJECTIVE AND OBJECTIVE BOX
SUBJECTIVE:    Pain: Controlled    Complaints: None    MILESTONES:    Alert and Oriented x 3  [ x ]  Out of bed/ ambulating. [ x ]  Flatus:   Positive [ x ]  Negative [  ]  Bowel movement  [  ] Positive [  ] Negative   Voiding [x  ] Due to void [  ]   Herndon/Indwelling catheter in place [  ]  Diet: Regular [ x ]  Clears [  ]  NPO [  ]    Infant feeding:  Breast [  ]   Bottle [  ]  Both [ x ]  Feeding related issues and/or concerns:      OBJECTIVE:  T(C): 36.9 (18 @ 06:17), Max: 37 (18 @ 14:35)  HR: 94 (18 @ 06:17) (94 - 107)  BP: 120/80 (18 @ 06:17) (119/81 - 133/72)  RR: 19 (18 @ 06:17) (18 - 19)  SpO2: 99% (18 @ 06:17) (99% - 100%)  Wt(kg): --                        8.5    13.56 )-----------( 214      ( 2018 06:00 )             27.0           Blood Type: A Positive    RPR: Negative          MEDICATIONS  (STANDING):  acetaminophen   Tablet 975 milliGRAM(s) Oral every 6 hours  diphtheria/tetanus/pertussis (acellular) Vaccine (ADAcel) 0.5 milliLiter(s) IntraMuscular once  ferrous    sulfate 325 milliGRAM(s) Oral daily  heparin  Injectable 5000 Unit(s) SubCutaneous every 12 hours  ibuprofen  Tablet 600 milliGRAM(s) Oral every 6 hours  influenza   Vaccine 0.5 milliLiter(s) IntraMuscular once  oxyCODONE    IR 5 milliGRAM(s) Oral every 3 hours  prenatal multivitamin 1 Tablet(s) Oral daily    MEDICATIONS  (PRN):  diphenhydrAMINE   Capsule 25 milliGRAM(s) Oral every 6 hours PRN Itching  docusate sodium 100 milliGRAM(s) Oral two times a day PRN Stool Softening  glycerin Suppository - Adult 1 Suppository(s) Rectal at bedtime PRN Constipation  lanolin Ointment 1 Application(s) Topical every 3 hours PRN Sore Nipples  magnesium hydroxide Suspension 30 milliLiter(s) Oral daily PRN Constipation  oxyCODONE    IR 5 milliGRAM(s) Oral every 4 hours PRN Severe Pain (7 - 10)  simethicone 80 milliGRAM(s) Chew every 4 hours PRN Gas        ASSESSMENT:    32y     G  4    P   3013      PO Day#  2        Delivery: Primary [  ]    Repeat [ x ]                                         Indication of procedure: Scheduled    Condition: Stable    Past Medical History significant for: HPI:      Current Issues:    Breasts:  Soft [x  ]   Engorged [  ]  Nipples:  Abdomen: Soft [ x ]   Distended [  ] Nontender [  ]   Bowel sounds :  Present [  ]  Absent [  ]   Fundus:  Firm [x  ]  Boggy [  ]  Abdominal incision: Clean, Dry and Intact [x  ]  Staples [  ] Steri Strips [  ] Dermabond [  ] Sutures [  ]    Patient wearing abdominal binder for support.    Vaginal: Lochia:  Heavy [  ]  Moderate [ x ]   Scant [  ]  Extremities: Edema [  ] Negative Carlos's Sign [  ] Nontender Michael  [ x ] Positive pedal pulses [  ]    Other relevant physical exam findings:      PLAN:    Plan: Increase ambulation, analgesia PRN and pain medication protocol standing oxycodone, ibuprofen and acetaminophen.    Diet: Regular diet    Continue routine post-operative and postpartum care.  Minimal Flatus. Increase OOB/Ambulation, MOM as needed. On Iron for Anemia.    Discharge Planning [ x ]    For discharge Today  [    ]    Consults:  Social Work [  ]  Lactation [ x ]  Other [         ]

## 2018-04-05 ENCOUNTER — MEDICATION RENEWAL (OUTPATIENT)
Age: 33
End: 2018-04-05

## 2018-04-05 VITALS
HEART RATE: 101 BPM | OXYGEN SATURATION: 100 % | DIASTOLIC BLOOD PRESSURE: 62 MMHG | RESPIRATION RATE: 20 BRPM | SYSTOLIC BLOOD PRESSURE: 102 MMHG | TEMPERATURE: 98 F

## 2018-04-05 RX ORDER — OXYCODONE 5 MG/1
5 TABLET ORAL
Qty: 30 | Refills: 0 | Status: ACTIVE | COMMUNITY
Start: 2018-04-05 | End: 1900-01-01

## 2018-04-05 RX ADMIN — Medication 600 MILLIGRAM(S): at 05:26

## 2018-04-05 RX ADMIN — OXYCODONE HYDROCHLORIDE 5 MILLIGRAM(S): 5 TABLET ORAL at 01:54

## 2018-04-05 RX ADMIN — HEPARIN SODIUM 5000 UNIT(S): 5000 INJECTION INTRAVENOUS; SUBCUTANEOUS at 00:00

## 2018-04-05 RX ADMIN — SIMETHICONE 80 MILLIGRAM(S): 80 TABLET, CHEWABLE ORAL at 00:01

## 2018-04-05 RX ADMIN — OXYCODONE HYDROCHLORIDE 5 MILLIGRAM(S): 5 TABLET ORAL at 02:35

## 2018-04-05 RX ADMIN — Medication 975 MILLIGRAM(S): at 01:54

## 2018-04-05 RX ADMIN — Medication 100 MILLIGRAM(S): at 00:01

## 2018-04-05 RX ADMIN — Medication 600 MILLIGRAM(S): at 06:03

## 2018-04-05 NOTE — PROGRESS NOTE ADULT - SUBJECTIVE AND OBJECTIVE BOX
SUBJECTIVE:    Pain: Controlled    Complaints: None    MILESTONES:    Alert and Oriented x 3  [ x ]  Out of bed/ ambulating. [ x ]  Flatus:   Positive [ x ]  Negative [  ]  Bowel movement  [  ] Positive [  ] Negative   Voiding [x  ] Due to void [  ]   Herndon/Indwelling catheter in place [  ]  Diet: Regular [ x ]  Clears [  ]  NPO [  ]    Infant feeding:  Breast [  ]   Bottle [  ]  Both [ x ]  Feeding related issues and/or concerns:      OBJECTIVE:  T(C): 36.8 (18 @ 05:52), Max: 37 (18 @ 13:56)  HR: 101 (18 @ 05:52) (96 - 106)  BP: 102/62 (18 @ 05:52) (102/62 - 132/61)  RR: 20 (18 @ 05:52) (18 - 20)  SpO2: 100% (18 @ 05:52) (100% - 100%)  Wt(kg): --                        8.5    13.56 )-----------( 214      ( 2018 06:00 )             27.0           Blood Type: A Positive    RPR: Negative          MEDICATIONS  (STANDING):  acetaminophen   Tablet 975 milliGRAM(s) Oral every 6 hours  diphtheria/tetanus/pertussis (acellular) Vaccine (ADAcel) 0.5 milliLiter(s) IntraMuscular once  ferrous    sulfate 325 milliGRAM(s) Oral daily  heparin  Injectable 5000 Unit(s) SubCutaneous every 12 hours  ibuprofen  Tablet 600 milliGRAM(s) Oral every 6 hours  influenza   Vaccine 0.5 milliLiter(s) IntraMuscular once  oxyCODONE    IR 5 milliGRAM(s) Oral every 3 hours  prenatal multivitamin 1 Tablet(s) Oral daily    MEDICATIONS  (PRN):  diphenhydrAMINE   Capsule 25 milliGRAM(s) Oral every 6 hours PRN Itching  docusate sodium 100 milliGRAM(s) Oral two times a day PRN Stool Softening  glycerin Suppository - Adult 1 Suppository(s) Rectal at bedtime PRN Constipation  lanolin Ointment 1 Application(s) Topical every 3 hours PRN Sore Nipples  magnesium hydroxide Suspension 30 milliLiter(s) Oral daily PRN Constipation  oxyCODONE    IR 5 milliGRAM(s) Oral every 4 hours PRN Severe Pain (7 - 10)  simethicone 80 milliGRAM(s) Chew every 4 hours PRN Gas        ASSESSMENT:    32y     G  4    P 3013       PO Day#  3        Delivery: Primary [  ]    Repeat [ x ]                                         Indication of procedure: Scheduled    Condition: Stable    Past Medical History significant for: HPI:      Current Issues:    Breasts:  Soft [x  ]   Engorged [  ]  Nipples:  Abdomen: Soft [ x ]   Distended [  ] Nontender [  ]   Bowel sounds :  Present [  ]  Absent [  ]   Fundus:  Firm [x  ]  Boggy [  ]  Abdominal incision: Clean, Dry and Intact [x  ]  Staples [  ] Steri Strips [ x ] Dermabond [  ] Sutures [  ]    Patient wearing abdominal binder for support.    Vaginal: Lochia:  Heavy [  ]  Moderate [ x ]   Scant [  ]  Extremities: Edema [ x ] Negative Carlos's Sign [ x ] Nontender Michael  [ x ] Positive pedal pulses [  ]    Other relevant physical exam findings:      PLAN:    Plan: Increase ambulation, analgesia PRN and pain medication protocol standing oxycodone, ibuprofen and acetaminophen.    Diet: Regular diet    Continue routine post-operative and postpartum care.     Discharge Planning [ x ]    For discharge Today  [ x   ]    Consults:  Social Work [  ]  Lactation [ x ]  Other [         ]

## 2018-04-07 ENCOUNTER — INPATIENT (INPATIENT)
Facility: HOSPITAL | Age: 33
LOS: 2 days | Discharge: HOME | End: 2018-04-10
Attending: OBSTETRICS & GYNECOLOGY | Admitting: OBSTETRICS & GYNECOLOGY
Payer: MEDICAID

## 2018-04-07 VITALS
OXYGEN SATURATION: 100 % | SYSTOLIC BLOOD PRESSURE: 128 MMHG | RESPIRATION RATE: 18 BRPM | HEART RATE: 99 BPM | TEMPERATURE: 98 F | DIASTOLIC BLOOD PRESSURE: 79 MMHG

## 2018-04-07 DIAGNOSIS — Z98.89 OTHER SPECIFIED POSTPROCEDURAL STATES: Chronic | ICD-10-CM

## 2018-04-07 LAB
BASE EXCESS BLDV CALC-SCNC: -2 MMOL/L — SIGNIFICANT CHANGE UP
BASOPHILS # BLD AUTO: 0.01 K/UL — SIGNIFICANT CHANGE UP (ref 0–0.2)
BASOPHILS NFR BLD AUTO: 0.1 % — SIGNIFICANT CHANGE UP (ref 0–2)
BLOOD GAS VENOUS - CREATININE: 0.51 MG/DL — SIGNIFICANT CHANGE UP (ref 0.5–1.3)
CHLORIDE BLDV-SCNC: 108 MMOL/L — SIGNIFICANT CHANGE UP (ref 96–108)
EOSINOPHIL # BLD AUTO: 0.17 K/UL — SIGNIFICANT CHANGE UP (ref 0–0.5)
EOSINOPHIL NFR BLD AUTO: 1.8 % — SIGNIFICANT CHANGE UP (ref 0–6)
GAS PNL BLDV: 136 MMOL/L — SIGNIFICANT CHANGE UP (ref 136–146)
GLUCOSE BLDV-MCNC: 77 — SIGNIFICANT CHANGE UP (ref 70–99)
HCO3 BLDV-SCNC: 22 MMOL/L — SIGNIFICANT CHANGE UP (ref 20–27)
HCT VFR BLD CALC: 26.9 % — LOW (ref 34.5–45)
HCT VFR BLDV CALC: 27.2 % — LOW (ref 34.5–45)
HGB BLD-MCNC: 8.3 G/DL — LOW (ref 11.5–15.5)
HGB BLDV-MCNC: 8.8 G/DL — LOW (ref 11.5–15.5)
IMM GRANULOCYTES # BLD AUTO: 0.16 # — SIGNIFICANT CHANGE UP
IMM GRANULOCYTES NFR BLD AUTO: 1.6 % — HIGH (ref 0–1.5)
LACTATE BLDV-MCNC: 1.2 MMOL/L — SIGNIFICANT CHANGE UP (ref 0.5–2)
LYMPHOCYTES # BLD AUTO: 1.52 K/UL — SIGNIFICANT CHANGE UP (ref 1–3.3)
LYMPHOCYTES # BLD AUTO: 15.7 % — SIGNIFICANT CHANGE UP (ref 13–44)
MCHC RBC-ENTMCNC: 26.8 PG — LOW (ref 27–34)
MCHC RBC-ENTMCNC: 30.9 % — LOW (ref 32–36)
MCV RBC AUTO: 86.8 FL — SIGNIFICANT CHANGE UP (ref 80–100)
MONOCYTES # BLD AUTO: 0.75 K/UL — SIGNIFICANT CHANGE UP (ref 0–0.9)
MONOCYTES NFR BLD AUTO: 7.7 % — SIGNIFICANT CHANGE UP (ref 2–14)
NEUTROPHILS # BLD AUTO: 7.09 K/UL — SIGNIFICANT CHANGE UP (ref 1.8–7.4)
NEUTROPHILS NFR BLD AUTO: 73.1 % — SIGNIFICANT CHANGE UP (ref 43–77)
NRBC # FLD: 0.04 — SIGNIFICANT CHANGE UP
PCO2 BLDV: 43 MMHG — SIGNIFICANT CHANGE UP (ref 41–51)
PH BLDV: 7.35 PH — SIGNIFICANT CHANGE UP (ref 7.32–7.43)
PLATELET # BLD AUTO: 311 K/UL — SIGNIFICANT CHANGE UP (ref 150–400)
PMV BLD: 9.9 FL — SIGNIFICANT CHANGE UP (ref 7–13)
PO2 BLDV: 34 MMHG — LOW (ref 35–40)
POTASSIUM BLDV-SCNC: 3.7 MMOL/L — SIGNIFICANT CHANGE UP (ref 3.4–4.5)
RBC # BLD: 3.1 M/UL — LOW (ref 3.8–5.2)
RBC # FLD: 14.1 % — SIGNIFICANT CHANGE UP (ref 10.3–14.5)
SAO2 % BLDV: 56.1 % — LOW (ref 60–85)
WBC # BLD: 9.7 K/UL — SIGNIFICANT CHANGE UP (ref 3.8–10.5)
WBC # FLD AUTO: 9.7 K/UL — SIGNIFICANT CHANGE UP (ref 3.8–10.5)

## 2018-04-07 NOTE — ED ADULT NURSE NOTE - CHIEF COMPLAINT
The patient is a 32y Female complaining of fever since yesterday, took 1g of tylenol at 7pm today. Reports had  on Monday and was discharged last night, hx of x3 c-sections. Pt c/o right incisional pain, cough x3 days, reports was told to come to ED

## 2018-04-07 NOTE — ED ADULT NURSE NOTE - TEMPERATURE IN FAHRENHEIT (DEGREES F)

## 2018-04-07 NOTE — ED ADULT NURSE NOTE - CHIEF COMPLAINT QUOTE
Pt st" I had  on  ....in hospital I had fever but not over 100 MD felt it was hormonal....was discharge last night the fevers went to 101.7 . Took tylenol. Fever spiked again around 7pm took another dose. Called Md told to come to ED" Insicision looks great but have a burning constant pain on rt side of incision site. " "I feel the vaginal bleeding the clotts coming out are bigger.....not soaking pads....just noticed the clotts when urinating." Pt appears pale. L&D called spoke with Mary Anne. As per Mary Anne " she needs to be seen in ED."

## 2018-04-07 NOTE — ED PROVIDER NOTE - OBJECTIVE STATEMENT
32F presents with fever.  She is currently 4d s/p scheduled .  Was discharged yesterday, developed a fever to 101.7 at home, improved w Tylenol.  This evening developed another fever to 101.4.  Her OB is Dr. Ledbetter, spoke w on-call who instructed her to come to ED.  Reports mild cough for 3d, nonproductive, no SOB.  Reports some incisional pain, taking one oxycodone a day.  No n/v. + dysuria but no frequency or hesitancy.  No rash.  Mild b/l leg swelling.

## 2018-04-07 NOTE — ED ADULT TRIAGE NOTE - CHIEF COMPLAINT QUOTE
Pt st" I had  on  ....in hospital I had fever but not over 100 MD felt it was hormonal....was discharge last night the fevers went to 101.7 . Took tylenol. Fever spiked again around 7pm took another dose. Called Md told to come to ED" Insicision looks great but have a burning constant pain on rt side of incision site. " "I feel the vaginal bleeding the clotts coming out are bigger.....not soaking pads....just noticed the clotts when urinating." Pt appears pale. Pt st" I had  on  ....in hospital I had fever but not over 100 MD felt it was hormonal....was discharge last night the fevers went to 101.7 . Took tylenol. Fever spiked again around 7pm took another dose. Called Md told to come to ED" Insicision looks great but have a burning constant pain on rt side of incision site. " "I feel the vaginal bleeding the clotts coming out are bigger.....not soaking pads....just noticed the clotts when urinating." Pt appears pale. L&D called spoke with Mary Anne. As per Mary Anne " she needs to be seen in ED."

## 2018-04-07 NOTE — ED ADULT NURSE NOTE - OBJECTIVE STATEMENT
Received pt in room 5, ambulatory, pt A&Ox4, respirations even and unlabored b/l. No redness noted on  incision site, clean and dry. IVL 20g Angiocath placed on right AC. Labs sent. MD Wick at bedside for eval.  and infant at bedside. Will continue to monitor.

## 2018-04-08 DIAGNOSIS — R50.9 FEVER, UNSPECIFIED: ICD-10-CM

## 2018-04-08 DIAGNOSIS — R18.8 OTHER ASCITES: ICD-10-CM

## 2018-04-08 LAB
ALBUMIN SERPL ELPH-MCNC: 2.8 G/DL — LOW (ref 3.3–5)
ALP SERPL-CCNC: 122 U/L — HIGH (ref 40–120)
ALT FLD-CCNC: 18 U/L — SIGNIFICANT CHANGE UP (ref 4–33)
APPEARANCE UR: CLEAR — SIGNIFICANT CHANGE UP
AST SERPL-CCNC: 12 U/L — SIGNIFICANT CHANGE UP (ref 4–32)
BACTERIA # UR AUTO: SIGNIFICANT CHANGE UP
BASOPHILS # BLD AUTO: 0.02 K/UL — SIGNIFICANT CHANGE UP (ref 0–0.2)
BASOPHILS NFR BLD AUTO: 0.2 % — SIGNIFICANT CHANGE UP (ref 0–2)
BILIRUB SERPL-MCNC: < 0.2 MG/DL — LOW (ref 0.2–1.2)
BILIRUB UR-MCNC: NEGATIVE — SIGNIFICANT CHANGE UP
BLOOD UR QL VISUAL: HIGH
BUN SERPL-MCNC: 10 MG/DL — SIGNIFICANT CHANGE UP (ref 7–23)
CALCIUM SERPL-MCNC: 8.4 MG/DL — SIGNIFICANT CHANGE UP (ref 8.4–10.5)
CHLORIDE SERPL-SCNC: 105 MMOL/L — SIGNIFICANT CHANGE UP (ref 98–107)
CO2 SERPL-SCNC: 21 MMOL/L — LOW (ref 22–31)
COLOR SPEC: YELLOW — SIGNIFICANT CHANGE UP
CREAT SERPL-MCNC: 0.61 MG/DL — SIGNIFICANT CHANGE UP (ref 0.5–1.3)
EOSINOPHIL # BLD AUTO: 0.13 K/UL — SIGNIFICANT CHANGE UP (ref 0–0.5)
EOSINOPHIL NFR BLD AUTO: 1.5 % — SIGNIFICANT CHANGE UP (ref 0–6)
GLUCOSE SERPL-MCNC: 86 MG/DL — SIGNIFICANT CHANGE UP (ref 70–99)
GLUCOSE UR-MCNC: NEGATIVE — SIGNIFICANT CHANGE UP
HCT VFR BLD CALC: 24.7 % — LOW (ref 34.5–45)
HGB BLD-MCNC: 7.6 G/DL — LOW (ref 11.5–15.5)
IMM GRANULOCYTES # BLD AUTO: 0.15 # — SIGNIFICANT CHANGE UP
IMM GRANULOCYTES NFR BLD AUTO: 1.7 % — HIGH (ref 0–1.5)
KETONES UR-MCNC: NEGATIVE — SIGNIFICANT CHANGE UP
LEUKOCYTE ESTERASE UR-ACNC: HIGH
LYMPHOCYTES # BLD AUTO: 1.41 K/UL — SIGNIFICANT CHANGE UP (ref 1–3.3)
LYMPHOCYTES # BLD AUTO: 15.9 % — SIGNIFICANT CHANGE UP (ref 13–44)
MCHC RBC-ENTMCNC: 26.5 PG — LOW (ref 27–34)
MCHC RBC-ENTMCNC: 30.8 % — LOW (ref 32–36)
MCV RBC AUTO: 86.1 FL — SIGNIFICANT CHANGE UP (ref 80–100)
MONOCYTES # BLD AUTO: 0.72 K/UL — SIGNIFICANT CHANGE UP (ref 0–0.9)
MONOCYTES NFR BLD AUTO: 8.1 % — SIGNIFICANT CHANGE UP (ref 2–14)
MUCOUS THREADS # UR AUTO: SIGNIFICANT CHANGE UP
NEUTROPHILS # BLD AUTO: 6.43 K/UL — SIGNIFICANT CHANGE UP (ref 1.8–7.4)
NEUTROPHILS NFR BLD AUTO: 72.6 % — SIGNIFICANT CHANGE UP (ref 43–77)
NITRITE UR-MCNC: NEGATIVE — SIGNIFICANT CHANGE UP
NON-SQ EPI CELLS # UR AUTO: <1 — SIGNIFICANT CHANGE UP
NRBC # FLD: 0.02 — SIGNIFICANT CHANGE UP
PH UR: 6.5 — SIGNIFICANT CHANGE UP (ref 4.6–8)
PLATELET # BLD AUTO: 286 K/UL — SIGNIFICANT CHANGE UP (ref 150–400)
PMV BLD: 10.5 FL — SIGNIFICANT CHANGE UP (ref 7–13)
POTASSIUM SERPL-MCNC: 3.9 MMOL/L — SIGNIFICANT CHANGE UP (ref 3.5–5.3)
POTASSIUM SERPL-SCNC: 3.9 MMOL/L — SIGNIFICANT CHANGE UP (ref 3.5–5.3)
PROT SERPL-MCNC: 6.3 G/DL — SIGNIFICANT CHANGE UP (ref 6–8.3)
PROT UR-MCNC: 20 MG/DL — SIGNIFICANT CHANGE UP
RBC # BLD: 2.87 M/UL — LOW (ref 3.8–5.2)
RBC # FLD: 14 % — SIGNIFICANT CHANGE UP (ref 10.3–14.5)
RBC CASTS # UR COMP ASSIST: >50 — HIGH (ref 0–?)
SODIUM SERPL-SCNC: 138 MMOL/L — SIGNIFICANT CHANGE UP (ref 135–145)
SP GR SPEC: 1.02 — SIGNIFICANT CHANGE UP (ref 1–1.04)
SQUAMOUS # UR AUTO: SIGNIFICANT CHANGE UP
UROBILINOGEN FLD QL: NORMAL MG/DL — SIGNIFICANT CHANGE UP
WBC # BLD: 8.86 K/UL — SIGNIFICANT CHANGE UP (ref 3.8–10.5)
WBC # FLD AUTO: 8.86 K/UL — SIGNIFICANT CHANGE UP (ref 3.8–10.5)
WBC UR QL: >50 — HIGH (ref 0–?)

## 2018-04-08 PROCEDURE — 74177 CT ABD & PELVIS W/CONTRAST: CPT | Mod: 26

## 2018-04-08 PROCEDURE — 71046 X-RAY EXAM CHEST 2 VIEWS: CPT | Mod: 26

## 2018-04-08 RX ORDER — ERTAPENEM SODIUM 1 G/1
1000 INJECTION, POWDER, LYOPHILIZED, FOR SOLUTION INTRAMUSCULAR; INTRAVENOUS EVERY 24 HOURS
Qty: 0 | Refills: 0 | Status: DISCONTINUED | OUTPATIENT
Start: 2018-04-09 | End: 2018-04-10

## 2018-04-08 RX ORDER — HEPARIN SODIUM 5000 [USP'U]/ML
5000 INJECTION INTRAVENOUS; SUBCUTANEOUS EVERY 12 HOURS
Qty: 0 | Refills: 0 | Status: DISCONTINUED | OUTPATIENT
Start: 2018-04-08 | End: 2018-04-10

## 2018-04-08 RX ORDER — ERTAPENEM SODIUM 1 G/1
INJECTION, POWDER, LYOPHILIZED, FOR SOLUTION INTRAMUSCULAR; INTRAVENOUS
Qty: 0 | Refills: 0 | Status: DISCONTINUED | OUTPATIENT
Start: 2018-04-08 | End: 2018-04-10

## 2018-04-08 RX ORDER — ACETAMINOPHEN 500 MG
975 TABLET ORAL EVERY 6 HOURS
Qty: 0 | Refills: 0 | Status: DISCONTINUED | OUTPATIENT
Start: 2018-04-08 | End: 2018-04-08

## 2018-04-08 RX ORDER — OXYCODONE HYDROCHLORIDE 5 MG/1
5 TABLET ORAL EVERY 4 HOURS
Qty: 0 | Refills: 0 | Status: DISCONTINUED | OUTPATIENT
Start: 2018-04-08 | End: 2018-04-09

## 2018-04-08 RX ORDER — SODIUM CHLORIDE 9 MG/ML
1000 INJECTION, SOLUTION INTRAVENOUS ONCE
Qty: 0 | Refills: 0 | Status: COMPLETED | OUTPATIENT
Start: 2018-04-08 | End: 2018-04-08

## 2018-04-08 RX ORDER — ERTAPENEM SODIUM 1 G/1
1000 INJECTION, POWDER, LYOPHILIZED, FOR SOLUTION INTRAMUSCULAR; INTRAVENOUS ONCE
Qty: 0 | Refills: 0 | Status: COMPLETED | OUTPATIENT
Start: 2018-04-08 | End: 2018-04-08

## 2018-04-08 RX ADMIN — OXYCODONE HYDROCHLORIDE 5 MILLIGRAM(S): 5 TABLET ORAL at 20:53

## 2018-04-08 RX ADMIN — HEPARIN SODIUM 5000 UNIT(S): 5000 INJECTION INTRAVENOUS; SUBCUTANEOUS at 11:30

## 2018-04-08 RX ADMIN — Medication 975 MILLIGRAM(S): at 06:09

## 2018-04-08 RX ADMIN — OXYCODONE HYDROCHLORIDE 5 MILLIGRAM(S): 5 TABLET ORAL at 14:40

## 2018-04-08 RX ADMIN — OXYCODONE HYDROCHLORIDE 5 MILLIGRAM(S): 5 TABLET ORAL at 15:30

## 2018-04-08 RX ADMIN — ERTAPENEM SODIUM 120 MILLIGRAM(S): 1 INJECTION, POWDER, LYOPHILIZED, FOR SOLUTION INTRAMUSCULAR; INTRAVENOUS at 04:41

## 2018-04-08 RX ADMIN — OXYCODONE HYDROCHLORIDE 5 MILLIGRAM(S): 5 TABLET ORAL at 21:21

## 2018-04-08 RX ADMIN — Medication 975 MILLIGRAM(S): at 18:30

## 2018-04-08 RX ADMIN — HEPARIN SODIUM 5000 UNIT(S): 5000 INJECTION INTRAVENOUS; SUBCUTANEOUS at 22:56

## 2018-04-08 RX ADMIN — OXYCODONE HYDROCHLORIDE 5 MILLIGRAM(S): 5 TABLET ORAL at 10:36

## 2018-04-08 RX ADMIN — SODIUM CHLORIDE 1000 MILLILITER(S): 9 INJECTION, SOLUTION INTRAVENOUS at 02:04

## 2018-04-08 RX ADMIN — Medication 975 MILLIGRAM(S): at 12:44

## 2018-04-08 RX ADMIN — OXYCODONE HYDROCHLORIDE 5 MILLIGRAM(S): 5 TABLET ORAL at 09:55

## 2018-04-08 NOTE — PROGRESS NOTE ADULT - ASSESSMENT
33 y/o P3 POD#5 s/p rLTCS with uncomplicated post-operative course with fevers at home. 31 y/o P3 POD#5 s/p rLTCS with uncomplicated post-operative course with fevers at home x several days duration.

## 2018-04-08 NOTE — PROGRESS NOTE ADULT - SUBJECTIVE AND OBJECTIVE BOX
HPI: 32y  POD#5 s/p rLTCS () with uncomplicated post-operative course presents after 2 days of fevers at home. Pt notes that she has been having low grade fevers at home x 2 days but that fever was 101.2F tonight. Pt took tylenol which relieved the fever. Pt denies any associated SOB, CP, n/v. Able to tolerate PO. Pt notes slight increase in VB today. She denies any abnormal vaginal discharge. She also notes slight abdominal pressure associated with urination but denies hematuria, urgency. Pt also notes incisional pain, worse on R side.    OB/GYN HISTORY:     Name of GYN Physician: Anahy     OBGYN: C/S x 3, otherwise denies fibroids, cysts, abn PAP, STIs  PMH: denies	  PSH: C/S x 3  Meds: denies  Allergies: denies  Social: denies  FHx: denies    ROS wnl, except as per HPI    Vital Signs Last 24 Hrs  T(C): 37.4 (2018 23:52), Max: 37.4 (2018 23:52)  T(F): 99.4 (2018 23:52), Max: 99.4 (2018 23:52)  HR: 100 (2018 23:52) (99 - 100)  BP: 152/95 (2018 23:52) (128/79 - 152/95)  RR: 20 (2018 23:52) (18 - 20)  SpO2: 100% (2018 23:52) (100% - 100%)    Physical Exam:  Gen:AAOx3, NAD  CV: RRR  Pulm:CTAB/L  Abd: soft, NT, ND, fundus firm and mildly tender to deep palpation, +BS  Gyn: 10cc dark red blood noted in vaginal vault, cervix closed on bimanual exam, no CMT, 14wk size uterus  Ext: NTB/L    LABS:                        8.3    9.70  )-----------( 311      ( 2018 23:40 )             26.9     04-07    138  |  105  |  10  ----------------------------<  86  3.9   |  21<L>  |  0.61    Ca    8.4      2018 23:40    TPro  6.3  /  Alb  2.8<L>  /  TBili  < 0.2<L>  /  DBili  x   /  AST  12  /  ALT  18  /  AlkPhos  122<H>        RADIOLOGY & ADDITIONAL STUDIES:  CXR pending HPI: 32y  POD#5 s/p rLTCS () with uncomplicated post-operative course presents after 2 days of fevers at home. Pt notes that she has been having low grade fevers at home x 2 days but that fever was 101.2F tonight. Pt took tylenol which relieved the fever. Pt denies any associated SOB, CP, n/v. Able to tolerate PO. Pt notes slight increase in VB today. She denies any abnormal vaginal discharge. She also notes slight abdominal pressure associated with urination but denies hematuria, urgency. Pt also notes incisional pain, worse on R side. Pt breastfeeding but notes that "baby does not empty her breasts out completely." She is not pumping at this time. Pt denies any breast pain or tenderness.    OB/GYN HISTORY:     Name of GYN Physician: Anahy     OBGYN: C/S x 3, otherwise denies fibroids, cysts, abn PAP, STIs  PMH: denies	  PSH: C/S x 3  Meds: denies  Allergies: metronidazole  Social: denies  FHx: denies    ROS wnl, except as per HPI    Vital Signs Last 24 Hrs  T(C): 37.4 (2018 23:52), Max: 37.4 (2018 23:52)  T(F): 99.4 (2018 23:52), Max: 99.4 (2018 23:52)  HR: 100 (2018 23:52) (99 - 100)  BP: 152/95 (2018 23:52) (128/79 - 152/95)  RR: 20 (2018 23:52) (18 - 20)  SpO2: 100% (2018 23:52) (100% - 100%)    Physical Exam:  Gen:AAOx3, NAD  CV: RRR  Pulm:CTAB/L  Breast: no areas on erythema or induration noted, breasts engorged  Abd: soft, NT, ND, fundus firm and mildly tender to deep palpation, +BS  Gyn: 10cc dark red blood noted in vaginal vault, cervix closed on bimanual exam, no CMT, 14wk size uterus  Ext: NTB/L    LABS:                        8.3    9.70  )-----------( 311      ( 2018 23:40 )             26.9     04-07    138  |  105  |  10  ----------------------------<  86  3.9   |  21<L>  |  0.61    Ca    8.4      2018 23:40    TPro  6.3  /  Alb  2.8<L>  /  TBili  < 0.2<L>  /  DBili  x   /  AST  12  /  ALT  18  /  AlkPhos  122<H>        RADIOLOGY & ADDITIONAL STUDIES:  CXR pending  CTAP pending

## 2018-04-08 NOTE — H&P ADULT - ASSESSMENT
33 y/o P3 POD#5 s/p rLTCS with uncomplicated post-operative course with fevers at home x several days duration, found to have bladder flap collection (8.4x4.8cm) with possible superimposed infection. Pt is clinically stable at this time.

## 2018-04-08 NOTE — H&P ADULT - HISTORY OF PRESENT ILLNESS
HPI: 32y  POD#5 s/p rLTCS () with uncomplicated post-operative course presents after 2 days of fevers at home. Pt notes that she has been having low grade fevers at home x 2 days but that fever was 101.2F tonight. Pt took tylenol which relieved the fever. Pt denies any associated SOB, CP, n/v. Able to tolerate PO. Pt notes slight increase in VB today. She denies any abnormal vaginal discharge. She also notes slight abdominal pressure associated with urination but denies hematuria, urgency. Pt also notes incisional pain, worse on R side. Pt breastfeeding but notes that "baby does not empty her breasts out completely." She is not pumping at this time. Pt denies any breast pain or tenderness.

## 2018-04-08 NOTE — H&P ADULT - NSHPPHYSICALEXAM_GEN_ALL_CORE
Vital Signs Last 24 Hrs  T(C): 37.4 (07 Apr 2018 23:52), Max: 37.4 (07 Apr 2018 23:52)  T(F): 99.4 (07 Apr 2018 23:52), Max: 99.4 (07 Apr 2018 23:52)  HR: 100 (07 Apr 2018 23:52) (99 - 100)  BP: 152/95 (07 Apr 2018 23:52) (128/79 - 152/95)  RR: 20 (07 Apr 2018 23:52) (18 - 20)  SpO2: 100% (07 Apr 2018 23:52) (100% - 100%)    Physical Exam:  Gen:AAOx3, NAD  CV: RRR  Pulm:CTAB/L  Breast: no areas on erythema or induration noted, breasts engorged  Abd: soft, NT, ND, fundus firm and mildly tender to deep palpation, +BS  Gyn: 10cc dark red blood noted in vaginal vault, cervix closed on bimanual exam, no CMT, 14wk size uterus  Ext: NTB/L

## 2018-04-08 NOTE — H&P ADULT - PROBLEM SELECTOR PLAN 1
-admit to antepartum  -start Invanz  -regular diet  -monitor VS  -tylenol for fever  -continue breastfeeding    Pt seen with Dr. Roxanne Dowd, [email or web address]

## 2018-04-09 LAB
BACTERIA UR CULT: SIGNIFICANT CHANGE UP
SPECIMEN SOURCE: SIGNIFICANT CHANGE UP

## 2018-04-09 RX ORDER — OXYCODONE HYDROCHLORIDE 5 MG/1
5 TABLET ORAL EVERY 4 HOURS
Qty: 0 | Refills: 0 | Status: DISCONTINUED | OUTPATIENT
Start: 2018-04-09 | End: 2018-04-10

## 2018-04-09 RX ORDER — ACETAMINOPHEN 500 MG
975 TABLET ORAL EVERY 6 HOURS
Qty: 0 | Refills: 0 | Status: DISCONTINUED | OUTPATIENT
Start: 2018-04-09 | End: 2018-04-10

## 2018-04-09 RX ADMIN — Medication 975 MILLIGRAM(S): at 18:53

## 2018-04-09 RX ADMIN — OXYCODONE HYDROCHLORIDE 5 MILLIGRAM(S): 5 TABLET ORAL at 03:11

## 2018-04-09 RX ADMIN — Medication 975 MILLIGRAM(S): at 18:26

## 2018-04-09 RX ADMIN — OXYCODONE HYDROCHLORIDE 5 MILLIGRAM(S): 5 TABLET ORAL at 08:49

## 2018-04-09 RX ADMIN — OXYCODONE HYDROCHLORIDE 5 MILLIGRAM(S): 5 TABLET ORAL at 12:50

## 2018-04-09 RX ADMIN — OXYCODONE HYDROCHLORIDE 5 MILLIGRAM(S): 5 TABLET ORAL at 03:49

## 2018-04-09 RX ADMIN — Medication 975 MILLIGRAM(S): at 13:30

## 2018-04-09 RX ADMIN — OXYCODONE HYDROCHLORIDE 5 MILLIGRAM(S): 5 TABLET ORAL at 00:04

## 2018-04-09 RX ADMIN — OXYCODONE HYDROCHLORIDE 5 MILLIGRAM(S): 5 TABLET ORAL at 01:00

## 2018-04-09 RX ADMIN — OXYCODONE HYDROCHLORIDE 5 MILLIGRAM(S): 5 TABLET ORAL at 09:30

## 2018-04-09 RX ADMIN — OXYCODONE HYDROCHLORIDE 5 MILLIGRAM(S): 5 TABLET ORAL at 13:30

## 2018-04-09 RX ADMIN — HEPARIN SODIUM 5000 UNIT(S): 5000 INJECTION INTRAVENOUS; SUBCUTANEOUS at 12:15

## 2018-04-09 RX ADMIN — Medication 975 MILLIGRAM(S): at 12:50

## 2018-04-09 RX ADMIN — OXYCODONE HYDROCHLORIDE 5 MILLIGRAM(S): 5 TABLET ORAL at 18:53

## 2018-04-09 RX ADMIN — HEPARIN SODIUM 5000 UNIT(S): 5000 INJECTION INTRAVENOUS; SUBCUTANEOUS at 22:20

## 2018-04-09 RX ADMIN — OXYCODONE HYDROCHLORIDE 5 MILLIGRAM(S): 5 TABLET ORAL at 18:26

## 2018-04-09 RX ADMIN — ERTAPENEM SODIUM 120 MILLIGRAM(S): 1 INJECTION, POWDER, LYOPHILIZED, FOR SOLUTION INTRAMUSCULAR; INTRAVENOUS at 03:11

## 2018-04-09 NOTE — PROGRESS NOTE ADULT - ATTENDING COMMENTS
Pt seen and examined  Abdomen soft, nontender, incision clean dry and intact  A/P - Bladder flap hematoma  continue IV invanz  if continues afebrile, consider change to po and discharge tomorrow  discussed with MFM, Dr Garza

## 2018-04-09 NOTE — PROGRESS NOTE ADULT - SUBJECTIVE AND OBJECTIVE BOX
R3 OB/GYN Note:    Pt seen and examined at bedside.  No acute events overnight. Pt reports intermittent pelvic pain and pressure. Pt is ambulating, voiding and tolerating PO. Denies fever, chills, n/v, abdominal pain, heavy bleeding, dysuria, hematuria or frequency.     MEDICATIONS  (STANDING):  ertapenem  IVPB      ertapenem  IVPB 1000 milliGRAM(s) IV Intermittent every 24 hours  heparin  Injectable 5000 Unit(s) SubCutaneous every 12 hours    MEDICATIONS  (PRN):  oxyCODONE    IR 5 milliGRAM(s) Oral every 4 hours PRN Severe Pain (7 - 10)    Vital Signs Last 24 Hrs  T(C): 37.3 (2018 05:48), Max: 37.3 (2018 18:16)  T(F): 99.2 (2018 05:48), Max: 99.2 (2018 05:48)  HR: 96 (2018 05:48) (81 - 96)  BP: 129/85 (2018 05:48) (114/76 - 129/85)  BP(mean): --  RR: 19 (2018 05:48) (17 - 19)  SpO2: 100% (2018 05:48) (99% - 100%)      PHYSICAL EXAM:    GA: NAD, A+0 x 3  CV: RRR  Pulm: CTA BL  Abd: BS, soft, nontender, nondistended, no rebound or guarding,   Incision: clean, dry and intact;   Uterus: Fundus midline; firm   Extremities: no swelling or calf tenderness      LABS:                        7.6    8.86  )-----------( 286      ( 2018 05:15 )             24.7         138  |  105  |  10  ----------------------------<  86  3.9   |  21<L>  |  0.61    Ca    8.4      2018 23:40    TPro  6.3  /  Alb  2.8<L>  /  TBili  < 0.2<L>  /  DBili  x   /  AST  12  /  ALT  18  /  AlkPhos  122<H>  04-07      Urinalysis Basic - ( 2018 00:10 )    Color: YELLOW / Appearance: CLEAR / S.019 / pH: 6.5  Gluc: NEGATIVE / Ketone: NEGATIVE  / Bili: NEGATIVE / Urobili: NORMAL mg/dL   Blood: LARGE / Protein: 20 mg/dL / Nitrite: NEGATIVE   Leuk Esterase: LARGE / RBC: >50 / WBC >50   Sq Epi: OCC / Non Sq Epi: x / Bacteria: FEW        RADIOLOGY & ADDITIONAL TESTS:

## 2018-04-10 ENCOUNTER — TRANSCRIPTION ENCOUNTER (OUTPATIENT)
Age: 33
End: 2018-04-10

## 2018-04-10 VITALS
RESPIRATION RATE: 17 BRPM | OXYGEN SATURATION: 98 % | HEART RATE: 81 BPM | TEMPERATURE: 98 F | SYSTOLIC BLOOD PRESSURE: 125 MMHG | DIASTOLIC BLOOD PRESSURE: 81 MMHG

## 2018-04-10 LAB
BASOPHILS # BLD AUTO: 0.03 K/UL — SIGNIFICANT CHANGE UP (ref 0–0.2)
BASOPHILS NFR BLD AUTO: 0.4 % — SIGNIFICANT CHANGE UP (ref 0–2)
EOSINOPHIL # BLD AUTO: 0.2 K/UL — SIGNIFICANT CHANGE UP (ref 0–0.5)
EOSINOPHIL NFR BLD AUTO: 2.6 % — SIGNIFICANT CHANGE UP (ref 0–6)
HCT VFR BLD CALC: 26.4 % — LOW (ref 34.5–45)
HGB BLD-MCNC: 8.2 G/DL — LOW (ref 11.5–15.5)
IMM GRANULOCYTES # BLD AUTO: 0.27 # — SIGNIFICANT CHANGE UP
IMM GRANULOCYTES NFR BLD AUTO: 3.4 % — HIGH (ref 0–1.5)
LYMPHOCYTES # BLD AUTO: 1.78 K/UL — SIGNIFICANT CHANGE UP (ref 1–3.3)
LYMPHOCYTES # BLD AUTO: 22.7 % — SIGNIFICANT CHANGE UP (ref 13–44)
MCHC RBC-ENTMCNC: 26.7 PG — LOW (ref 27–34)
MCHC RBC-ENTMCNC: 31.1 % — LOW (ref 32–36)
MCV RBC AUTO: 86 FL — SIGNIFICANT CHANGE UP (ref 80–100)
MONOCYTES # BLD AUTO: 0.57 K/UL — SIGNIFICANT CHANGE UP (ref 0–0.9)
MONOCYTES NFR BLD AUTO: 7.3 % — SIGNIFICANT CHANGE UP (ref 2–14)
NEUTROPHILS # BLD AUTO: 4.99 K/UL — SIGNIFICANT CHANGE UP (ref 1.8–7.4)
NEUTROPHILS NFR BLD AUTO: 63.6 % — SIGNIFICANT CHANGE UP (ref 43–77)
NRBC # FLD: 0 — SIGNIFICANT CHANGE UP
PLATELET # BLD AUTO: 373 K/UL — SIGNIFICANT CHANGE UP (ref 150–400)
PMV BLD: 9.5 FL — SIGNIFICANT CHANGE UP (ref 7–13)
RBC # BLD: 3.07 M/UL — LOW (ref 3.8–5.2)
RBC # FLD: 13.8 % — SIGNIFICANT CHANGE UP (ref 10.3–14.5)
WBC # BLD: 7.84 K/UL — SIGNIFICANT CHANGE UP (ref 3.8–10.5)
WBC # FLD AUTO: 7.84 K/UL — SIGNIFICANT CHANGE UP (ref 3.8–10.5)

## 2018-04-10 RX ORDER — FAMOTIDINE 10 MG/ML
20 INJECTION INTRAVENOUS ONCE
Qty: 0 | Refills: 0 | Status: COMPLETED | OUTPATIENT
Start: 2018-04-10 | End: 2018-04-10

## 2018-04-10 RX ORDER — SIMETHICONE 80 MG/1
80 TABLET, CHEWABLE ORAL ONCE
Qty: 0 | Refills: 0 | Status: COMPLETED | OUTPATIENT
Start: 2018-04-10 | End: 2018-04-10

## 2018-04-10 RX ADMIN — FAMOTIDINE 20 MILLIGRAM(S): 10 INJECTION INTRAVENOUS at 01:51

## 2018-04-10 RX ADMIN — OXYCODONE HYDROCHLORIDE 5 MILLIGRAM(S): 5 TABLET ORAL at 00:12

## 2018-04-10 RX ADMIN — OXYCODONE HYDROCHLORIDE 5 MILLIGRAM(S): 5 TABLET ORAL at 10:25

## 2018-04-10 RX ADMIN — OXYCODONE HYDROCHLORIDE 5 MILLIGRAM(S): 5 TABLET ORAL at 00:42

## 2018-04-10 RX ADMIN — Medication 975 MILLIGRAM(S): at 00:12

## 2018-04-10 RX ADMIN — ERTAPENEM SODIUM 120 MILLIGRAM(S): 1 INJECTION, POWDER, LYOPHILIZED, FOR SOLUTION INTRAMUSCULAR; INTRAVENOUS at 04:14

## 2018-04-10 RX ADMIN — Medication 975 MILLIGRAM(S): at 00:42

## 2018-04-10 RX ADMIN — SIMETHICONE 80 MILLIGRAM(S): 80 TABLET, CHEWABLE ORAL at 01:51

## 2018-04-10 NOTE — DISCHARGE NOTE ADULT - CARE PROVIDER_API CALL
Emerald Higginbotham), Obstetrics and Gynecology  [address]  [address]  [place] [address]  Phone: [phone]  Fax: [phone]

## 2018-04-10 NOTE — DISCHARGE NOTE ADULT - MEDICATION SUMMARY - MEDICATIONS TO TAKE
I will START or STAY ON the medications listed below when I get home from the hospital:    amoxicillin-clavulanate 875 mg-125 mg oral tablet  -- 1 tab(s) by mouth every 12 hours MDD:2  -- Finish all this medication unless otherwise directed by prescriber.  Take with food or milk.    -- Indication: For bladder flap hematoma

## 2018-04-10 NOTE — PROGRESS NOTE ADULT - PROBLEM SELECTOR PLAN 1
-Stable, afebrile    Neuro: Optimize pain control with PO meds  CV: Hemodynamically stable   Pulm: O2 wnl on RA  GI: Reg diet  : 8.4X4.8 bladder flap collection found on CT AP. Pt being treated with IV invanz. Pt voiding w/o difficulty. Continue IV antibiotics. F/U UCx  Heme: HSQ for DVT PPX  ID: Afebrile, continue IV Invanz for treatment of bladder flap collection, f/u AM CBC and cultures  Dispo: Continue routine care
Stable, afebrile  Neuro: Pain well control with PO meds  CV: Hemodynamically stable   Pulm: O2 wnl on RA  GI: Reg diet  : 8.4X4.8 bladder flap collection found on CT AP. IR was consulted for possible IR drainage and they recommend conservative management with antibiotics. Pt voiding w/o difficulty.   Heme: HSQ for DVT PPX  ID: Afebrile, continue IV Invanz for treatment of bladder flap collection, will transition to PO Augmetin today. F/U AM CBC and urine culture  Dispo: Start d/c planning     MARY Diaz, PGY3
-continue to monitor VS  -CTAP to evaluate for intraabdominal pathology  -IVF hydration  -f/u CXR  -re-evaluate after imaging    plan d/w Dr. Roxanne Dowd, pgy2

## 2018-04-10 NOTE — DISCHARGE NOTE ADULT - PLAN OF CARE
Return to normal function 1) Continue Augmentin twice daily for 10 days  2) Oral oxycodone, motrin or tylenol for pain  3) Follow up with you doctor in 1-2 weeks

## 2018-04-10 NOTE — DISCHARGE NOTE ADULT - HOSPITAL COURSE
33 yo  s/p repeat C/S (), readmitted with bladder flap hematomas. At the time of admission pt reported fevers and abdominal pain. Cultures were sent and pt was started on IV Invanz. CT AP was obtained which showed an 8 cm bladder flap collection concerning for a hematoma. Pt's pain was well managed with PO meds. Pt remained afebrile throughout the admission. Blood and urine cultures came back negative. At the time of discharge pt was afebrile and pain was well controlled. She was discharged home in stable condition. She will follow up in the office in 1 week.

## 2018-04-10 NOTE — PROGRESS NOTE ADULT - SUBJECTIVE AND OBJECTIVE BOX
R3 OB/GYN Note:    Pt seen and examined at bedside.  Pt reports some gas pain overnight that improved with mylicon. Pt otherwise feeling well. Pt is ambulating, voiding and tolerating PO. Denies fever, chills, n/v, abdominal pain, heavy bleeding, dysuria, hematuria, chest pain or SOB.     MEDICATIONS  (STANDING):  ertapenem  IVPB      ertapenem  IVPB 1000 milliGRAM(s) IV Intermittent every 24 hours  heparin  Injectable 5000 Unit(s) SubCutaneous every 12 hours    MEDICATIONS  (PRN):  acetaminophen   Tablet. 975 milliGRAM(s) Oral every 6 hours PRN Mild Pain (1 - 3)  oxyCODONE    IR 5 milliGRAM(s) Oral every 4 hours PRN Severe Pain (7 - 10)    Vital Signs Last 24 Hrs  T(C): 36.7 (10 Apr 2018 06:00), Max: 37.2 (09 Apr 2018 10:07)  T(F): 98.1 (10 Apr 2018 06:00), Max: 99 (09 Apr 2018 10:07)  HR: 81 (10 Apr 2018 06:00) (81 - 106)  BP: 125/81 (10 Apr 2018 06:00) (121/76 - 136/94)  BP(mean): --  RR: 17 (10 Apr 2018 06:00) (17 - 18)  SpO2: 98% (10 Apr 2018 06:00) (98% - 99%)    PHYSICAL EXAM:    GA: NAD, A+0 x 3  CV: RRR  Pulm: CTA BL  Abd: soft, nontender, mildly distended, no rebound or guarding,   Incision: clean, dry and intact; steri-strips in place  Uterus: Fundus midline; firm   : lochia WNL  Extremities: no swelling or calf tenderness        LABS:                        8.2    7.84  )-----------( 373      ( 10 Apr 2018 05:00 )             26.4                 RADIOLOGY & ADDITIONAL TESTS:

## 2018-04-10 NOTE — DISCHARGE NOTE ADULT - PATIENT PORTAL LINK FT
You can access the [facility] Patient Portal, offered by [facility], by registering with the following website: [email or web address]

## 2018-04-10 NOTE — DISCHARGE NOTE ADULT - CARE PLAN
Principal Discharge DX:	Postpartum fever  Goal:	Return to normal function  Assessment and plan of treatment:	1) Continue Augmentin twice daily for 10 days  2) Oral oxycodone, motrin or tylenol for pain  3) Follow up with you doctor in 1-2 weeks

## 2018-04-11 LAB
BACTERIA UR CULT: SIGNIFICANT CHANGE UP
SPECIMEN SOURCE: SIGNIFICANT CHANGE UP

## 2018-04-13 LAB
BACTERIA BLD CULT: SIGNIFICANT CHANGE UP
BACTERIA BLD CULT: SIGNIFICANT CHANGE UP

## 2018-04-17 ENCOUNTER — APPOINTMENT (OUTPATIENT)
Dept: OBGYN | Facility: CLINIC | Age: 33
End: 2018-04-17
Payer: COMMERCIAL

## 2018-04-17 PROCEDURE — 0503F POSTPARTUM CARE VISIT: CPT

## 2018-04-17 RX ORDER — OXYCODONE 5 MG/1
5 TABLET ORAL
Qty: 30 | Refills: 0 | Status: ACTIVE | COMMUNITY
Start: 2018-04-17 | End: 1900-01-01

## 2018-05-22 ENCOUNTER — APPOINTMENT (OUTPATIENT)
Dept: OBGYN | Facility: CLINIC | Age: 33
End: 2018-05-22
Payer: COMMERCIAL

## 2018-05-22 VITALS
WEIGHT: 182 LBS | DIASTOLIC BLOOD PRESSURE: 60 MMHG | SYSTOLIC BLOOD PRESSURE: 100 MMHG | HEIGHT: 62 IN | BODY MASS INDEX: 33.49 KG/M2

## 2018-05-22 DIAGNOSIS — Z87.42 PERSONAL HISTORY OF OTHER DISEASES OF THE FEMALE GENITAL TRACT: ICD-10-CM

## 2018-05-22 DIAGNOSIS — Z98.891 HISTORY OF UTERINE SCAR FROM PREVIOUS SURGERY: ICD-10-CM

## 2018-05-22 DIAGNOSIS — Z3A.34 34 WEEKS GESTATION OF PREGNANCY: ICD-10-CM

## 2018-05-22 DIAGNOSIS — R18.8 OTHER ASCITES: ICD-10-CM

## 2018-05-22 DIAGNOSIS — Z3A.32 32 WEEKS GESTATION OF PREGNANCY: ICD-10-CM

## 2018-05-22 DIAGNOSIS — Z3A.26 26 WEEKS GESTATION OF PREGNANCY: ICD-10-CM

## 2018-05-22 DIAGNOSIS — Z3A.37 37 WEEKS GESTATION OF PREGNANCY: ICD-10-CM

## 2018-05-22 DIAGNOSIS — Z3A.38 38 WEEKS GESTATION OF PREGNANCY: ICD-10-CM

## 2018-05-22 DIAGNOSIS — O99.820 STREPTOCOCCUS B CARRIER STATE COMPLICATING PREGNANCY: ICD-10-CM

## 2018-05-22 DIAGNOSIS — Z3A.36 36 WEEKS GESTATION OF PREGNANCY: ICD-10-CM

## 2018-05-22 DIAGNOSIS — Z3A.22 22 WEEKS GESTATION OF PREGNANCY: ICD-10-CM

## 2018-05-22 DIAGNOSIS — O99.013 ANEMIA COMPLICATING PREGNANCY, THIRD TRIMESTER: ICD-10-CM

## 2018-05-22 DIAGNOSIS — Z3A.30 30 WEEKS GESTATION OF PREGNANCY: ICD-10-CM

## 2018-05-22 PROCEDURE — 0503F POSTPARTUM CARE VISIT: CPT

## 2018-09-25 ENCOUNTER — APPOINTMENT (OUTPATIENT)
Dept: OBGYN | Facility: CLINIC | Age: 33
End: 2018-09-25
Payer: COMMERCIAL

## 2018-09-25 VITALS
BODY MASS INDEX: 34.96 KG/M2 | HEIGHT: 62 IN | DIASTOLIC BLOOD PRESSURE: 72 MMHG | WEIGHT: 190 LBS | SYSTOLIC BLOOD PRESSURE: 124 MMHG

## 2018-09-25 DIAGNOSIS — Z80.3 FAMILY HISTORY OF MALIGNANT NEOPLASM OF BREAST: ICD-10-CM

## 2018-09-25 DIAGNOSIS — Z01.411 ENCOUNTER FOR GYNECOLOGICAL EXAMINATION (GENERAL) (ROUTINE) WITH ABNORMAL FINDINGS: ICD-10-CM

## 2018-09-25 DIAGNOSIS — R10.2 PELVIC AND PERINEAL PAIN: ICD-10-CM

## 2018-09-25 PROCEDURE — 99213 OFFICE O/P EST LOW 20 MIN: CPT | Mod: 25

## 2018-09-25 PROCEDURE — 99395 PREV VISIT EST AGE 18-39: CPT

## 2018-10-02 LAB — CYTOLOGY CVX/VAG DOC THIN PREP: NORMAL

## 2018-10-15 ENCOUNTER — APPOINTMENT (OUTPATIENT)
Dept: ULTRASOUND IMAGING | Facility: IMAGING CENTER | Age: 33
End: 2018-10-15
Payer: COMMERCIAL

## 2018-10-15 ENCOUNTER — APPOINTMENT (OUTPATIENT)
Dept: OBGYN | Facility: CLINIC | Age: 33
End: 2018-10-15
Payer: COMMERCIAL

## 2018-10-15 ENCOUNTER — OUTPATIENT (OUTPATIENT)
Dept: OUTPATIENT SERVICES | Facility: HOSPITAL | Age: 33
LOS: 1 days | End: 2018-10-15
Payer: COMMERCIAL

## 2018-10-15 ENCOUNTER — TRANSCRIPTION ENCOUNTER (OUTPATIENT)
Age: 33
End: 2018-10-15

## 2018-10-15 DIAGNOSIS — Z98.89 OTHER SPECIFIED POSTPROCEDURAL STATES: Chronic | ICD-10-CM

## 2018-10-15 DIAGNOSIS — Z00.8 ENCOUNTER FOR OTHER GENERAL EXAMINATION: ICD-10-CM

## 2018-10-15 PROCEDURE — 36415 COLL VENOUS BLD VENIPUNCTURE: CPT

## 2018-10-15 PROCEDURE — 76856 US EXAM PELVIC COMPLETE: CPT

## 2018-10-15 PROCEDURE — 76856 US EXAM PELVIC COMPLETE: CPT | Mod: 26

## 2018-10-15 PROCEDURE — 76830 TRANSVAGINAL US NON-OB: CPT

## 2018-10-15 PROCEDURE — 76830 TRANSVAGINAL US NON-OB: CPT | Mod: 26

## 2019-12-25 PROBLEM — R10.2 PELVIC PAIN IN FEMALE: Status: ACTIVE | Noted: 2018-09-25

## 2022-09-28 NOTE — H&P ADULT - NSHPLABSRESULTS_GEN_ALL_CORE
8.3    9.70  )-----------( 311      ( 07 Apr 2018 23:40 )             26.9     04-07    138  |  105  |  10  ----------------------------<  86  3.9   |  21<L>  |  0.61    Ca    8.4      07 Apr 2018 23:40    TPro  6.3  /  Alb  2.8<L>  /  TBili  < 0.2<L>  /  DBili  x   /  AST  12  /  ALT  18  /  AlkPhos  122<H>  04-07    CTAP:  FINDINGS:    LOWER CHEST: Within normal limits.    LIVER: Within normal limits.  BILE DUCTS: Normal caliber.  GALLBLADDER: Within normal limits.  SPLEEN: Within normal limits.  PANCREAS: Within normal limits.  ADRENALS: Within normal limits.  KIDNEYS/URETERS: Within normal limits.    BLADDER: Within normal limits.  REPRODUCTIVE ORGANS: Postpartum appearance of the uterus. 8.4 x 3.1 x 4.8   cm fluid collection between the lower uterine segment and urinary   bladder. Heterogeneous material within the endometrial canal likely   represents blood products.. No adnexal mass.    BOWEL: No bowel obstruction. Appendix is not visualized. No secondary   signs of appendicitis.  PERITONEUM: No ascites.  VESSELS:  Within normal limits.  RETROPERITONEUM: No lymphadenopathy.    ABDOMINAL WALL: Postsurgical changes.  BONES: Within normal limits.    IMPRESSION: 8.4 x 4.8 cm bladder flap collection may represent organizing   hematoma. Superimposed infection is not excluded.
28-Sep-2022 01:51